# Patient Record
Sex: FEMALE | Race: WHITE | NOT HISPANIC OR LATINO | Employment: FULL TIME | URBAN - METROPOLITAN AREA
[De-identification: names, ages, dates, MRNs, and addresses within clinical notes are randomized per-mention and may not be internally consistent; named-entity substitution may affect disease eponyms.]

---

## 2019-06-26 ENCOUNTER — SUPERVISING PHYSICIAN REVIEW (OUTPATIENT)
Dept: URGENT CARE | Facility: CLINIC | Age: 24
End: 2019-06-26

## 2019-06-26 ENCOUNTER — OFFICE VISIT (OUTPATIENT)
Dept: URGENT CARE | Facility: CLINIC | Age: 24
End: 2019-06-26
Payer: COMMERCIAL

## 2019-06-26 VITALS
RESPIRATION RATE: 20 BRPM | HEIGHT: 64 IN | HEART RATE: 60 BPM | BODY MASS INDEX: 22.53 KG/M2 | OXYGEN SATURATION: 97 % | SYSTOLIC BLOOD PRESSURE: 110 MMHG | TEMPERATURE: 98.4 F | DIASTOLIC BLOOD PRESSURE: 72 MMHG | WEIGHT: 132 LBS

## 2019-06-26 DIAGNOSIS — A08.4 VIRAL GASTROENTERITIS: ICD-10-CM

## 2019-06-26 LAB
APPEARANCE UR: CLEAR
BILIRUB UR STRIP-MCNC: ABNORMAL MG/DL
COLOR UR AUTO: YELLOW
GLUCOSE UR STRIP.AUTO-MCNC: ABNORMAL MG/DL
INT CON NEG: NORMAL
INT CON POS: NORMAL
KETONES UR STRIP.AUTO-MCNC: ABNORMAL MG/DL
LEUKOCYTE ESTERASE UR QL STRIP.AUTO: ABNORMAL
NITRITE UR QL STRIP.AUTO: ABNORMAL
PH UR STRIP.AUTO: 8.5 [PH] (ref 5–8)
POC URINE PREGNANCY TEST: NEGATIVE
PROT UR QL STRIP: ABNORMAL MG/DL
RBC UR QL AUTO: ABNORMAL
SP GR UR STRIP.AUTO: 1.02
UROBILINOGEN UR STRIP-MCNC: 0.2 MG/DL

## 2019-06-26 PROCEDURE — 99203 OFFICE O/P NEW LOW 30 MIN: CPT | Performed by: PHYSICIAN ASSISTANT

## 2019-06-26 PROCEDURE — 81025 URINE PREGNANCY TEST: CPT | Performed by: PHYSICIAN ASSISTANT

## 2019-06-26 PROCEDURE — 81002 URINALYSIS NONAUTO W/O SCOPE: CPT | Performed by: PHYSICIAN ASSISTANT

## 2019-06-26 RX ORDER — ONDANSETRON 4 MG/1
4 TABLET, ORALLY DISINTEGRATING ORAL EVERY 6 HOURS PRN
Qty: 10 TAB | Refills: 0 | Status: SHIPPED | OUTPATIENT
Start: 2019-06-26 | End: 2019-06-29

## 2019-06-26 RX ORDER — ONDANSETRON 4 MG/1
4 TABLET, ORALLY DISINTEGRATING ORAL EVERY 6 HOURS PRN
Qty: 10 TAB | Refills: 0 | Status: SHIPPED | OUTPATIENT
Start: 2019-06-26 | End: 2019-06-26 | Stop reason: SDUPTHER

## 2019-06-26 ASSESSMENT — ENCOUNTER SYMPTOMS
FLANK PAIN: 0
VOMITING: 1
ABDOMINAL PAIN: 1
SORE THROAT: 0
CHILLS: 0
HEADACHES: 1
MYALGIAS: 1
FEVER: 0
CHILLS: 1
BLOOD IN STOOL: 0
NAUSEA: 1
ROS GI COMMENTS: 1
DIARRHEA: 1
COUGH: 0

## 2019-06-26 NOTE — PROGRESS NOTES
"Subjective:   Melissa Mckeon is a 24 y.o. female who presents for GI Problem (Vomiting and diarrhea)    This is a new problem.  Patient presents to urgent care with 4-day history of nausea, vomiting and diarrhea.  The patient reports that on the first day she had significant nausea and vomiting with intermittent diarrhea.  The vomiting has subsided but she continues to experience nausea.  She has been experiencing a proximately 4 loose watery stools on a daily basis.  Patient denies any fever or chills.  Denies hematochezia or melena.  Denies recent travel to endemic areas, drinking from Rivers or streams, recent antibiotic use, eating suspect foods.  She has had no known illness exposure. Patient does admit to dark stools however she has been taking Pepto-Bismol which has not been very helpful.      Upon direct questioning the patient does admit to urinary frequency however she denies any dysuria or urgency with urination.      .    GI Problem    Associated symptoms include abdominal pain, headaches, myalgias, nausea and vomiting. Pertinent negatives include no chest pain, chills, congestion, coughing, fever, rash or sore throat.     Review of Systems   Constitutional: Positive for malaise/fatigue. Negative for chills and fever.   HENT: Negative for congestion, ear pain and sore throat.    Respiratory: Negative for cough.    Cardiovascular: Negative for chest pain.   Gastrointestinal: Positive for abdominal pain, diarrhea, nausea and vomiting. Negative for blood in stool and melena.        1   Genitourinary: Positive for frequency. Negative for dysuria, flank pain, hematuria and urgency.   Musculoskeletal: Positive for myalgias.   Skin: Negative for rash.   Neurological: Positive for headaches.   All other systems reviewed and are negative.    No Known Allergies     Objective:   /72   Pulse 60   Temp 36.9 °C (98.4 °F) (Temporal)   Resp 20   Ht 1.626 m (5' 4\")   Wt 59.9 kg (132 lb)   LMP " 06/17/2019 (Exact Date)   SpO2 97%   Breastfeeding? No   BMI 22.66 kg/m²    Physical Exam   Constitutional: She is oriented to person, place, and time. She appears well-developed and well-nourished.   HENT:   Head: Normocephalic and atraumatic.   Right Ear: Tympanic membrane, external ear and ear canal normal.   Left Ear: Tympanic membrane, external ear and ear canal normal.   Nose: Nose normal.   Mouth/Throat: Uvula is midline, oropharynx is clear and moist and mucous membranes are normal. No oropharyngeal exudate.   Eyes: Pupils are equal, round, and reactive to light. Conjunctivae and EOM are normal.   Neck: Normal range of motion. Neck supple.   Cardiovascular: Normal rate, regular rhythm and normal heart sounds.  Exam reveals no gallop and no friction rub.    No murmur heard.  Pulmonary/Chest: Effort normal and breath sounds normal. No respiratory distress. She has no wheezes. She has no rales.   Abdominal: Soft. Bowel sounds are normal. She exhibits no distension and no mass. There is no hepatosplenomegaly. There is generalized tenderness. There is no rigidity, no rebound, no guarding, no CVA tenderness, no tenderness at McBurney's point and negative Alcocer's sign.   Musculoskeletal: Normal range of motion. She exhibits no edema, tenderness or deformity.   Lymphadenopathy:        Head (right side): No submental, no submandibular and no tonsillar adenopathy present.        Head (left side): No submental, no submandibular and no tonsillar adenopathy present.     She has no cervical adenopathy.        Right: No supraclavicular adenopathy present.        Left: No supraclavicular adenopathy present.   Neurological: She is alert and oriented to person, place, and time. She has normal strength. No cranial nerve deficit or sensory deficit. Coordination normal.   Skin: Skin is warm and dry. No rash noted.   Psychiatric: She has a normal mood and affect. Judgment normal.   Vitals reviewed.         Assessment/Plan:    Assessment    1. Viral gastroenteritis  - POCT Urinalysis  - POCT Pregnancy  - ondansetron (ZOFRAN ODT) 4 MG TABLET DISPERSIBLE; Take 1 Tab by mouth every 6 hours as needed for Nausea for up to 3 days.  Dispense: 10 Tab; Refill: 0    Results for orders placed or performed in visit on 06/26/19   POCT Urinalysis   Result Value Ref Range    POC Color YELLOW Negative    POC Appearance CLEAR Negative    POC Leukocyte Esterase neg Negative    POC Nitrites neg Negative    POC Urobiligen 0.2 Negative (0.2) mg/dL    POC Protein neg Negative mg/dL    POC Urine PH 8.5 (A) 5.0 - 8.0    POC Blood neg Negative    POC Specific Gravity 1.020 <1.005 - >1.030    POC Ketones neg Negative mg/dL    POC Bilirubin neg Negative mg/dL    POC Glucose neg Negative mg/dL   POCT Pregnancy   Result Value Ref Range    POC Urine Pregnancy Test NEGATIVE Negative    Internal Control Positive Valid     Internal Control Negative Valid      Patient will be treated with Zofran.  Recommend frequent small sips of clear fluids with advance as tolerated.  Also recommend BRAT diet with advance as tolerated.  Signs and symptoms of dehydration reviewed.  Red flag warning symptoms with strict ER/follow-up precautions given.    Differential diagnosis, natural history, supportive care, and indications for immediate follow-up discussed.      If not improving in 3-5 days, F/U with PCP or return to  or sooner if worsens    Please note that this note was created using voice recognition speech to text software. Every effort has been made to correct obvious errors.  However, I expect there are errors of grammar and possibly context that were not discovered prior to finalizing the note  NATALI Greenfield PA-C

## 2019-06-26 NOTE — PROGRESS NOTES
"Subjective:   Mckeon. Germaine Ledesma is a 13 y.o. female who presents for GI Problem (Vomiting and diarrhea)    This is a new problem.  Patient presents to urgent care with 4-day history of nausea, vomiting and diarrhea.  Initially, she developed generalized body aches, chills with nausea, vomiting and diarrhea.  The vomiting has subsided however she has persisted with 4 loose watery stools per day on average as well as continued nausea.  Denies hematochezia, melena or hematemesis.  She does report that her stools have appeared somewhat dark however she has been taking Pepto-Bismol.  No recent travel to endemic areas, no drinking from [place] or streams, no eating suspect foods, no recent antibiotic use.  No known exposure to illness.  Upon direct questioning, patient does report frequency with urination.  She does however deny dysuria or urinary urgency.        GI Problem    Associated symptoms include abdominal pain, chills, headaches, myalgias, nausea and vomiting. Pertinent negatives include no chest pain, congestion, coughing, fever, rash or sore throat.     Review of Systems   Constitutional: Positive for chills and malaise/fatigue. Negative for fever.   HENT: Negative for congestion, ear pain and sore throat.    Respiratory: Negative for cough.    Cardiovascular: Negative for chest pain.   Gastrointestinal: Positive for abdominal pain, diarrhea, nausea and vomiting.        1   Genitourinary: Positive for frequency. Negative for dysuria, flank pain, hematuria and urgency.   Musculoskeletal: Positive for myalgias.   Skin: Negative for rash.   Neurological: Positive for headaches.   All other systems reviewed and are negative.    No Known Allergies     Objective:   /72   Pulse 60   Temp 36.9 °C (98.4 °F) (Temporal)   Resp 20   Ht 1.626 m (5' 4\")   Wt 59.9 kg (132 lb)   LMP 06/17/2019 (Exact Date)   SpO2 97%   Breastfeeding? No   BMI 22.66 kg/m²    Physical Exam   Constitutional: She is oriented to " person, place, and time. She appears well-developed and well-nourished.   HENT:   Head: Normocephalic and atraumatic.   Right Ear: Tympanic membrane, external ear and ear canal normal.   Left Ear: Tympanic membrane, external ear and ear canal normal.   Nose: Nose normal.   Mouth/Throat: Uvula is midline, oropharynx is clear and moist and mucous membranes are normal. No oropharyngeal exudate.   Eyes: Pupils are equal, round, and reactive to light. Conjunctivae and EOM are normal.   Neck: Normal range of motion. Neck supple.   Cardiovascular: Normal rate, regular rhythm and normal heart sounds.  Exam reveals no gallop and no friction rub.    No murmur heard.  Pulmonary/Chest: Effort normal and breath sounds normal. No respiratory distress. She has no wheezes. She has no rales.   Abdominal: Soft. Bowel sounds are normal. She exhibits no distension and no mass. There is no hepatosplenomegaly. There is generalized tenderness. There is no rigidity, no rebound, no guarding, no CVA tenderness, no tenderness at McBurney's point and negative Alcocer's sign.   Musculoskeletal: Normal range of motion. She exhibits no edema, tenderness or deformity.   Lymphadenopathy:        Head (right side): No submental, no submandibular and no tonsillar adenopathy present.        Head (left side): No submental, no submandibular and no tonsillar adenopathy present.     She has no cervical adenopathy.        Right: No supraclavicular adenopathy present.        Left: No supraclavicular adenopathy present.   Neurological: She is alert and oriented to person, place, and time. She has normal strength. No cranial nerve deficit or sensory deficit. Coordination normal.   Skin: Skin is warm and dry. No rash noted.   Psychiatric: She has a normal mood and affect. Judgment normal.   Vitals reviewed.         Assessment/Plan:   Assessment    1. Gastroenteritis  - POCT Urinalysis  - POCT Pregnancy  - ondansetron (ZOFRAN ODT) 4 MG TABLET DISPERSIBLE; Take 1  Tab by mouth every 6 hours as needed for Nausea for up to 3 days.  Dispense: 10 Tab; Refill: 0    Results for orders placed or performed in visit on 06/26/19   POCT Urinalysis   Result Value Ref Range    POC Color YELLOW Negative    POC Appearance CLEAR Negative    POC Leukocyte Esterase neg Negative    POC Nitrites neg Negative    POC Urobiligen 0.2 Negative (0.2) mg/dL    POC Protein neg Negative mg/dL    POC Urine PH 8.5 (A) 5.0 - 8.0    POC Blood neg Negative    POC Specific Gravity 1.020 <1.005 - >1.030    POC Ketones neg Negative mg/dL    POC Bilirubin neg Negative mg/dL    POC Glucose neg Negative mg/dL   POCT Pregnancy   Result Value Ref Range    POC Urine Pregnancy Test NEGATIVE Negative    Internal Control Positive Valid     Internal Control Negative Valid      Patient will be treated with Zofran.  Recommend frequent small sips of clear fluids,BRAT diet with advance as tolerated.  Signs and symptoms of dehydration reviewed.    Differential diagnosis, natural history, supportive care, and indications for immediate follow-up discussed.      If not improving in 3-5 days, F/U with PCP or return to  or sooner if worsens  Red flag warning symptoms and strict ER/follow-up precautions given.    Please note that this note was created using voice recognition speech to text software. Every effort has been made to correct obvious errors.  However, I expect there are errors of grammar and possibly context that were not discovered prior to finalizing the note  NATALI Greenfield PA-C

## 2019-11-18 LAB
ALBUMIN SERPL BCP-MCNC: 4.6 G/DL (ref 3.2–4.9)
ALBUMIN/GLOB SERPL: 1.5 G/DL
ALP SERPL-CCNC: 52 U/L (ref 30–99)
ALT SERPL-CCNC: 14 U/L (ref 2–50)
ANION GAP SERPL CALC-SCNC: 14 MMOL/L (ref 0–11.9)
AST SERPL-CCNC: 21 U/L (ref 12–45)
BASOPHILS # BLD AUTO: 0.4 % (ref 0–1.8)
BASOPHILS # BLD: 0.04 K/UL (ref 0–0.12)
BILIRUB SERPL-MCNC: 0.5 MG/DL (ref 0.1–1.5)
BUN SERPL-MCNC: 5 MG/DL (ref 8–22)
CALCIUM SERPL-MCNC: 9.7 MG/DL (ref 8.4–10.2)
CHLORIDE SERPL-SCNC: 106 MMOL/L (ref 96–112)
CO2 SERPL-SCNC: 21 MMOL/L (ref 20–33)
CREAT SERPL-MCNC: 0.9 MG/DL (ref 0.5–1.4)
EOSINOPHIL # BLD AUTO: 0.27 K/UL (ref 0–0.51)
EOSINOPHIL NFR BLD: 3 % (ref 0–6.9)
ERYTHROCYTE [DISTWIDTH] IN BLOOD BY AUTOMATED COUNT: 38.6 FL (ref 35.9–50)
GLOBULIN SER CALC-MCNC: 3 G/DL (ref 1.9–3.5)
GLUCOSE SERPL-MCNC: 89 MG/DL (ref 65–99)
HCG SERPL QL: NEGATIVE
HCT VFR BLD AUTO: 44.5 % (ref 37–47)
HGB BLD-MCNC: 15.7 G/DL (ref 12–16)
IMM GRANULOCYTES # BLD AUTO: 0.02 K/UL (ref 0–0.11)
IMM GRANULOCYTES NFR BLD AUTO: 0.2 % (ref 0–0.9)
LIPASE SERPL-CCNC: 51 U/L (ref 7–58)
LYMPHOCYTES # BLD AUTO: 2.02 K/UL (ref 1–4.8)
LYMPHOCYTES NFR BLD: 22.5 % (ref 22–41)
MCH RBC QN AUTO: 31 PG (ref 27–33)
MCHC RBC AUTO-ENTMCNC: 35.3 G/DL (ref 33.6–35)
MCV RBC AUTO: 87.8 FL (ref 81.4–97.8)
MONOCYTES # BLD AUTO: 0.77 K/UL (ref 0–0.85)
MONOCYTES NFR BLD AUTO: 8.6 % (ref 0–13.4)
NEUTROPHILS # BLD AUTO: 5.87 K/UL (ref 2–7.15)
NEUTROPHILS NFR BLD: 65.3 % (ref 44–72)
NRBC # BLD AUTO: 0 K/UL
NRBC BLD-RTO: 0 /100 WBC
PLATELET # BLD AUTO: 241 K/UL (ref 164–446)
PMV BLD AUTO: 9.8 FL (ref 9–12.9)
POTASSIUM SERPL-SCNC: 3.8 MMOL/L (ref 3.6–5.5)
PROT SERPL-MCNC: 7.6 G/DL (ref 6–8.2)
RBC # BLD AUTO: 5.07 M/UL (ref 4.2–5.4)
SODIUM SERPL-SCNC: 141 MMOL/L (ref 135–145)
WBC # BLD AUTO: 9 K/UL (ref 4.8–10.8)

## 2019-11-18 PROCEDURE — 80053 COMPREHEN METABOLIC PANEL: CPT

## 2019-11-18 PROCEDURE — 83690 ASSAY OF LIPASE: CPT

## 2019-11-18 PROCEDURE — 85025 COMPLETE CBC W/AUTO DIFF WBC: CPT

## 2019-11-18 PROCEDURE — 36415 COLL VENOUS BLD VENIPUNCTURE: CPT

## 2019-11-18 PROCEDURE — 84703 CHORIONIC GONADOTROPIN ASSAY: CPT

## 2019-11-18 PROCEDURE — 99284 EMERGENCY DEPT VISIT MOD MDM: CPT

## 2019-11-18 SDOH — HEALTH STABILITY: MENTAL HEALTH: HOW OFTEN DO YOU HAVE A DRINK CONTAINING ALCOHOL?: NEVER

## 2019-11-19 ENCOUNTER — HOSPITAL ENCOUNTER (EMERGENCY)
Facility: MEDICAL CENTER | Age: 24
End: 2019-11-19
Attending: EMERGENCY MEDICINE
Payer: COMMERCIAL

## 2019-11-19 VITALS
WEIGHT: 123.46 LBS | HEIGHT: 64 IN | BODY MASS INDEX: 21.08 KG/M2 | HEART RATE: 99 BPM | SYSTOLIC BLOOD PRESSURE: 121 MMHG | RESPIRATION RATE: 16 BRPM | TEMPERATURE: 98.9 F | DIASTOLIC BLOOD PRESSURE: 73 MMHG | OXYGEN SATURATION: 96 %

## 2019-11-19 DIAGNOSIS — R19.7 DIARRHEA, UNSPECIFIED TYPE: ICD-10-CM

## 2019-11-19 DIAGNOSIS — R11.2 NON-INTRACTABLE VOMITING WITH NAUSEA, UNSPECIFIED VOMITING TYPE: ICD-10-CM

## 2019-11-19 LAB
APPEARANCE UR: CLEAR
BACTERIA #/AREA URNS HPF: ABNORMAL /HPF
BILIRUB UR QL STRIP.AUTO: NEGATIVE
COLOR UR: YELLOW
EPI CELLS #/AREA URNS HPF: ABNORMAL /HPF
GLUCOSE UR STRIP.AUTO-MCNC: NEGATIVE MG/DL
KETONES UR STRIP.AUTO-MCNC: NEGATIVE MG/DL
LEUKOCYTE ESTERASE UR QL STRIP.AUTO: NEGATIVE
MICRO URNS: ABNORMAL
MUCOUS THREADS #/AREA URNS HPF: ABNORMAL /HPF
NITRITE UR QL STRIP.AUTO: NEGATIVE
PH UR STRIP.AUTO: 7.5 [PH] (ref 5–8)
PROT UR QL STRIP: NEGATIVE MG/DL
RBC # URNS HPF: ABNORMAL /HPF
RBC UR QL AUTO: ABNORMAL
SP GR UR STRIP.AUTO: 1.01
WBC #/AREA URNS HPF: ABNORMAL /HPF

## 2019-11-19 PROCEDURE — 700111 HCHG RX REV CODE 636 W/ 250 OVERRIDE (IP): Performed by: EMERGENCY MEDICINE

## 2019-11-19 PROCEDURE — 700105 HCHG RX REV CODE 258: Performed by: EMERGENCY MEDICINE

## 2019-11-19 PROCEDURE — 96374 THER/PROPH/DIAG INJ IV PUSH: CPT

## 2019-11-19 PROCEDURE — 81001 URINALYSIS AUTO W/SCOPE: CPT

## 2019-11-19 PROCEDURE — 96375 TX/PRO/DX INJ NEW DRUG ADDON: CPT

## 2019-11-19 RX ORDER — ONDANSETRON 4 MG/1
4 TABLET, ORALLY DISINTEGRATING ORAL EVERY 8 HOURS PRN
Qty: 9 TAB | Refills: 0 | Status: SHIPPED | OUTPATIENT
Start: 2019-11-19 | End: 2019-11-25

## 2019-11-19 RX ORDER — HALOPERIDOL 5 MG/ML
2 INJECTION INTRAMUSCULAR ONCE
Status: COMPLETED | OUTPATIENT
Start: 2019-11-19 | End: 2019-11-19

## 2019-11-19 RX ORDER — SODIUM CHLORIDE 9 MG/ML
1000 INJECTION, SOLUTION INTRAVENOUS ONCE
Status: COMPLETED | OUTPATIENT
Start: 2019-11-19 | End: 2019-11-19

## 2019-11-19 RX ORDER — DIPHENHYDRAMINE HYDROCHLORIDE 50 MG/ML
25 INJECTION INTRAMUSCULAR; INTRAVENOUS ONCE
Status: COMPLETED | OUTPATIENT
Start: 2019-11-19 | End: 2019-11-19

## 2019-11-19 RX ADMIN — DIPHENHYDRAMINE HYDROCHLORIDE 25 MG: 50 INJECTION INTRAMUSCULAR; INTRAVENOUS at 02:19

## 2019-11-19 RX ADMIN — SODIUM CHLORIDE 1000 ML: 9 INJECTION, SOLUTION INTRAVENOUS at 02:20

## 2019-11-19 RX ADMIN — HALOPERIDOL LACTATE 2 MG: 5 INJECTION, SOLUTION INTRAMUSCULAR at 02:20

## 2019-11-19 NOTE — ED NOTES
Pt given d/c paperwork, pt verbalized understanding all information given. Pt ambulated out of the ER w/o difficulty w/ friend

## 2019-11-19 NOTE — ED TRIAGE NOTES
Pt ambulates to triage with male guest  Chief Complaint   Patient presents with   • N/V   • Diarrhea   x 1 wk  X 3 episodes of vomiting and x 3 episodes of diarrhea today  Hx of gastritis, and smokes marijuana   Pt A & 0 x 4, speech clear, ambulates well  Pt with tech to have blood work drawn, pt updated on triage process and pt asked to inform RN of any changes.

## 2019-11-19 NOTE — ED PROVIDER NOTES
"ED Provider Note    CHIEF COMPLAINT  Nausea, vomiting, and diarrhea    HPI  Melissa Mckeon is a 24 y.o. female who presents to the emergency department for evaluation of nausea, vomiting, and diarrhea. The patient states that she has had nausea, vomiting, and diarrhea over the last 2 weeks. She is vomiting about 2-3 times per day. The emesis is non-bloody and non-bilious. She states that she is having about 2-3 episodes of black appearing diarrhea per day. She states that taking a hot shower does also help. She denies fever. She has been able to drink fluids but has not been eating solids. She admits to some vague cramping abdominal pain. She is urinating normally. She recent foreign travel, suspicious food, or sick contacts. She does admit to using marijuana daily.    REVIEW OF SYSTEMS  See HPI for further details. All other systems are negative.     PAST MEDICAL HISTORY   has a past medical history of Gastritis.    SOCIAL HISTORY  Social History     Tobacco Use   • Smoking status: Never Smoker   • Smokeless tobacco: Never Used   Substance and Sexual Activity   • Alcohol use: Never     Frequency: Never   • Drug use: Yes     Types: Inhaled     Comment: thc   • Sexual activity: Not on file       SURGICAL HISTORY  patient denies any surgical history    CURRENT MEDICATIONS  Home Medications     Reviewed by Ludy Dye R.N. (Registered Nurse) on 11/18/19 at 2206  Med List Status: <None>   Medication Last Dose Status        Patient Srini Taking any Medications                       ALLERGIES  No Known Allergies    PHYSICAL EXAM  VITAL SIGNS: /92   Pulse 98   Temp 37.2 °C (98.9 °F) (Temporal)   Resp 16   Ht 1.626 m (5' 4\")   Wt 56 kg (123 lb 7.3 oz)   LMP 10/19/2019   BMI 21.19 kg/m²    Constitutional: Alert and in mild distress.  HENT: Normocephalic atraumatic. Bilateral external ears normal. Nose normal. Mucous membranes are dry.  Eyes: Pupils are equal and reactive. Conjunctiva normal. " Non-icteric sclera.   Neck: Normal range of motion without tenderness. Supple. No meningeal signs.  Cardiovascular: Regular rate and rhythm. No murmurs, gallops or rubs.  Thorax & Lungs: Breath sounds are clear to auscultation bilaterally. No wheezing, rhonchi or rales.  Abdomen: Soft, nontender and nondistended. No peritoneal signs noted.  Skin: Warm and dry. No rashes are noted.  Back: No bony tenderness, No CVA tenderness.   Extremities: 2+ peripheral pulses. Cap refill is less than 2 seconds. No edema, cyanosis, or clubbing.  Musculoskeletal: Good range of motion in all major joints. No tenderness to palpation or major deformities noted.   Neurologic: Alert and oriented ×3. The patient moves all 4 extremities and follows commands.  Psychiatric: Affect is normal. Judgment appears to be intact.    DIAGNOSTIC STUDIES / PROCEDURES    LABS  Results for orders placed or performed during the hospital encounter of 11/19/19   CBC WITH DIFFERENTIAL   Result Value Ref Range    WBC 9.0 4.8 - 10.8 K/uL    RBC 5.07 4.20 - 5.40 M/uL    Hemoglobin 15.7 12.0 - 16.0 g/dL    Hematocrit 44.5 37.0 - 47.0 %    MCV 87.8 81.4 - 97.8 fL    MCH 31.0 27.0 - 33.0 pg    MCHC 35.3 (H) 33.6 - 35.0 g/dL    RDW 38.6 35.9 - 50.0 fL    Platelet Count 241 164 - 446 K/uL    MPV 9.8 9.0 - 12.9 fL    Neutrophils-Polys 65.30 44.00 - 72.00 %    Lymphocytes 22.50 22.00 - 41.00 %    Monocytes 8.60 0.00 - 13.40 %    Eosinophils 3.00 0.00 - 6.90 %    Basophils 0.40 0.00 - 1.80 %    Immature Granulocytes 0.20 0.00 - 0.90 %    Nucleated RBC 0.00 /100 WBC    Neutrophils (Absolute) 5.87 2.00 - 7.15 K/uL    Lymphs (Absolute) 2.02 1.00 - 4.80 K/uL    Monos (Absolute) 0.77 0.00 - 0.85 K/uL    Eos (Absolute) 0.27 0.00 - 0.51 K/uL    Baso (Absolute) 0.04 0.00 - 0.12 K/uL    Immature Granulocytes (abs) 0.02 0.00 - 0.11 K/uL    NRBC (Absolute) 0.00 K/uL   COMP METABOLIC PANEL   Result Value Ref Range    Sodium 141 135 - 145 mmol/L    Potassium 3.8 3.6 - 5.5 mmol/L     Chloride 106 96 - 112 mmol/L    Co2 21 20 - 33 mmol/L    Anion Gap 14.0 (H) 0.0 - 11.9    Glucose 89 65 - 99 mg/dL    Bun 5 (L) 8 - 22 mg/dL    Creatinine 0.90 0.50 - 1.40 mg/dL    Calcium 9.7 8.4 - 10.2 mg/dL    AST(SGOT) 21 12 - 45 U/L    ALT(SGPT) 14 2 - 50 U/L    Alkaline Phosphatase 52 30 - 99 U/L    Total Bilirubin 0.5 0.1 - 1.5 mg/dL    Albumin 4.6 3.2 - 4.9 g/dL    Total Protein 7.6 6.0 - 8.2 g/dL    Globulin 3.0 1.9 - 3.5 g/dL    A-G Ratio 1.5 g/dL   LIPASE   Result Value Ref Range    Lipase 51 7 - 58 U/L   HCG QUAL SERUM   Result Value Ref Range    Beta-Hcg Qualitative Serum Negative Negative   URINALYSIS,CULTURE IF INDICATED   Result Value Ref Range    Color Yellow     Character Clear     Specific Gravity 1.010 <1.035    Ph 7.5 5.0 - 8.0    Glucose Negative Negative mg/dL    Ketones Negative Negative mg/dL    Protein Negative Negative mg/dL    Bilirubin Negative Negative    Nitrite Negative Negative    Leukocyte Esterase Negative Negative    Occult Blood Small (A) Negative    Micro Urine Req Microscopic    ESTIMATED GFR   Result Value Ref Range    GFR If African American >60 >60 mL/min/1.73 m 2    GFR If Non African American >60 >60 mL/min/1.73 m 2   URINE MICROSCOPIC (W/UA)   Result Value Ref Range    WBC 0-2 /hpf    RBC 0-2 /hpf    Bacteria Few (A) None /hpf    Epithelial Cells Few Few /hpf    Mucous Threads Few /hpf     COURSE & MEDICAL DECISION MAKING  Pertinent Labs & Imaging studies reviewed. (See chart for details)    This is a 24-year-old female presenting to the emergency department for the evaluation of vomiting and diarrhea.  On initial evaluation, the patient appeared uncomfortable but her abdominal exam was benign with no tenderness or distention.  Her vital signs were normal.  Her mucous membranes were dry and given her persistent vomiting, the plan was made to establish an IV and give her IV fluids.  She had admitted to using marijuana on a daily basis for an extended period of time and I  am concerned that she may be suffering from cannabinoid induced hyperemesis.  She was treated with Haldol and Benadryl as well as IV fluids.  Upon reassessment, she appeared markedly improved and tolerated an oral challenge.  Her blood work here in the emergency department was reassuring with no significant metabolic derangements.  Her white blood cell count was normal and I have low clinical suspicion for infectious etiology especially given the lack of fevers.  Pregnancy test was negative.  No infection was noted in the urine.  She had mentioned dark stools but declined a rectal exam.  She had admitted to taking Pepto-Bismol and I suspect this is the etiology of the dark stools especially given her normal H&H.  I do believe she stable for discharge at this time and encouraged her to follow-up with a primary care physician.  She was given a referral to the \[facility]\"" clinic and will make an appointment.  She understands return to the ED with any worsening signs or symptoms.    FINAL IMPRESSION  1. Non-intractable vomiting with nausea, unspecified vomiting type    2. Diarrhea, unspecified type      PRESCRIPTIONS  New Prescriptions    ONDANSETRON (ZOFRAN ODT) 4 MG TABLET DISPERSIBLE    Take 1 Tab by mouth every 8 hours as needed for Nausea.     FOLLOW UP  [address] [address]  [phone]  Call in 1 day  To schedule a follow-up with appointment    [facility], Emergency Dept  [id number]  [facility] [id number] [phone]  Go to   As needed    -DISCHARGE-    Electronically signed by: Reanna Soler, 11/19/2019 1:38 AM

## 2019-11-25 ENCOUNTER — OFFICE VISIT (OUTPATIENT)
Dept: URGENT CARE | Facility: CLINIC | Age: 24
End: 2019-11-25
Payer: COMMERCIAL

## 2019-11-25 VITALS
SYSTOLIC BLOOD PRESSURE: 104 MMHG | BODY MASS INDEX: 20.6 KG/M2 | RESPIRATION RATE: 16 BRPM | OXYGEN SATURATION: 98 % | WEIGHT: 120 LBS | HEART RATE: 72 BPM | TEMPERATURE: 98.2 F | DIASTOLIC BLOOD PRESSURE: 78 MMHG

## 2019-11-25 DIAGNOSIS — R11.0 NAUSEA: ICD-10-CM

## 2019-11-25 DIAGNOSIS — R19.7 DIARRHEA, UNSPECIFIED TYPE: Primary | ICD-10-CM

## 2019-11-25 PROCEDURE — 99214 OFFICE O/P EST MOD 30 MIN: CPT | Performed by: PHYSICIAN ASSISTANT

## 2019-11-25 RX ORDER — ONDANSETRON 8 MG/1
8 TABLET, ORALLY DISINTEGRATING ORAL EVERY 8 HOURS PRN
Qty: 15 TAB | Refills: 0 | Status: SHIPPED | OUTPATIENT
Start: 2019-11-25 | End: 2020-10-09

## 2019-11-25 ASSESSMENT — ENCOUNTER SYMPTOMS
FATIGUE: 1
WEIGHT LOSS: 1
DIARRHEA: 1

## 2019-11-25 NOTE — PROGRESS NOTES
Subjective:      Melissa Mckeon is a 24 y.o. female who presents with Diarrhea (x2.5 weeks todays became yellow, lower right abdominal pain ); Fatigue; and Weight Loss (10/15lbs over the last two weeks )    PMH:  has a past medical history of Gastritis.  MEDS:   Current Outpatient Medications:   •  ondansetron (ZOFRAN ODT) 8 MG TABLET DISPERSIBLE, Take 1 Tab by mouth every 8 hours as needed for Nausea., Disp: 15 Tab, Rfl: 0  ALLERGIES: No Known Allergies  SURGHX: History reviewed. No pertinent surgical history.  SOCHX:  reports that she has never smoked. She has never used smokeless tobacco. She reports current drug use. Drug: Inhaled. She reports that she does not drink alcohol.  FH: Reviewed with patient, not pertinent to this visit.           Patient presents with:  Diarrhea: x 4 weeks, waxing and waning in severity and number of stools daily.  Today her stool is yellow, with crampy lower right abdominal pain but denies bloody or black stool.  Pt has been eating a very bland diet, no caffeine, tried pepto but didn't really help and caused black stool, imodium completely stopped diarrhea but as soon as she would have a BM it was diarrhea.    Fatigue  Weight Loss: 10/15lbs over the last month due to diarrhea.    PT was seen in ER for same complaint 2 weeks ago, no real change in symptoms.      PT denies fever, chills, Urinary symptoms, history of UC, crohn's, IBS or IBD.          Diarrhea    This is a new problem. The current episode started 1 to 4 weeks ago. The problem occurs 2 to 4 times per day. The problem has been waxing and waning. The stool consistency is described as mucous and watery. The patient states that diarrhea does not awaken her from sleep. Associated symptoms include abdominal pain (crampy just prior to episode), headaches and weight loss. Pertinent negatives include no arthralgias, chills, fever, increased  flatus, myalgias, sweats or vomiting. Exacerbated by: unknown. There are no known  risk factors. She has tried anti-motility drug, bismuth subsalicylate, change of diet, electrolyte solution and increased fluids for the symptoms. The treatment provided no relief.   Fatigue   Associated symptoms include abdominal pain (crampy just prior to episode), fatigue, headaches and nausea. Pertinent negatives include no arthralgias, chest pain, chills, diaphoresis, fever, myalgias or vomiting.   Weight Loss   Associated symptoms include abdominal pain (crampy just prior to episode), fatigue, headaches and nausea. Pertinent negatives include no arthralgias, chest pain, chills, diaphoresis, fever, myalgias or vomiting.       Review of Systems   Constitutional: Positive for fatigue, malaise/fatigue and weight loss. Negative for chills, diaphoresis and fever.   Respiratory: Negative for sputum production and shortness of breath.    Cardiovascular: Negative for chest pain and palpitations.   Gastrointestinal: Positive for abdominal pain (crampy just prior to episode), diarrhea and nausea. Negative for blood in stool, constipation, flatus, heartburn, melena and vomiting.   Musculoskeletal: Negative for arthralgias and myalgias.   Neurological: Positive for headaches.   All other systems reviewed and are negative.         Objective:     /78   Pulse 72   Temp 36.8 °C (98.2 °F) (Temporal)   Resp 16   Wt 54.4 kg (120 lb)   SpO2 98%   BMI 20.60 kg/m²      Physical Exam  Vitals signs and nursing note reviewed.   Constitutional:       General: She is not in acute distress.     Appearance: Normal appearance. She is well-developed and normal weight. She is not ill-appearing, toxic-appearing or diaphoretic.   HENT:      Head: Normocephalic and atraumatic.      Nose: Nose normal.      Mouth/Throat:      Mouth: Mucous membranes are moist.      Pharynx: No posterior oropharyngeal erythema.   Eyes:      Extraocular Movements: Extraocular movements intact.      Conjunctiva/sclera: Conjunctivae normal.      Pupils:  Pupils are equal, round, and reactive to light.   Neck:      Musculoskeletal: Normal range of motion and neck supple.      Vascular: No JVD.   Cardiovascular:      Rate and Rhythm: Normal rate and regular rhythm.      Pulses: Normal pulses.      Heart sounds: Normal heart sounds.   Pulmonary:      Effort: Pulmonary effort is normal.      Breath sounds: Normal breath sounds.   Abdominal:      General: Abdomen is flat. Bowel sounds are increased.      Palpations: Abdomen is soft. There is no mass.      Tenderness: There is no tenderness. There is no guarding or rebound.      Hernia: No hernia is present.   Musculoskeletal: Normal range of motion.   Skin:     General: Skin is warm and dry.      Capillary Refill: Capillary refill takes less than 2 seconds.   Neurological:      General: No focal deficit present.      Mental Status: She is alert and oriented to person, place, and time.      Gait: Gait normal.   Psychiatric:         Mood and Affect: Mood normal.                 Assessment/Plan:     1. Diarrhea, unspecified type  CULTURE STOOL    C Diff by PCR rflx Toxin    ondansetron (ZOFRAN ODT) 8 MG TABLET DISPERSIBLE    CANCELED: Complete O&P   2. Nausea  ondansetron (ZOFRAN ODT) 8 MG TABLET DISPERSIBLE     Stool collection kit given to patient with instructions.    PT to follow clear diet for 8-12 hours, then progress to bland diet for 24 hours, then progress to regular diet as tolerated.     PT should follow up with PCP in 1-2 days for re-evaluation if symptoms have not improved.  Discussed red flags and reasons to return to UC or ED.  Pt and/or family verbalized understanding of diagnosis and follow up instructions and was offered informational handout on diagnosis.  PT discharged.

## 2019-11-29 ENCOUNTER — HOSPITAL ENCOUNTER (OUTPATIENT)
Facility: MEDICAL CENTER | Age: 24
End: 2019-11-29
Attending: PHYSICIAN ASSISTANT
Payer: COMMERCIAL

## 2019-11-29 DIAGNOSIS — R19.7 DIARRHEA, UNSPECIFIED TYPE: ICD-10-CM

## 2019-11-29 LAB
G LAMBLIA+C PARVUM AG STL QL RAPID: NORMAL
SIGNIFICANT IND 70042: NORMAL
SITE SITE: NORMAL
SOURCE SOURCE: NORMAL

## 2019-11-29 PROCEDURE — 87328 CRYPTOSPORIDIUM AG IA: CPT

## 2019-11-29 PROCEDURE — 87046 STOOL CULTR AEROBIC BACT EA: CPT

## 2019-11-29 PROCEDURE — 87045 FECES CULTURE AEROBIC BACT: CPT

## 2019-11-29 PROCEDURE — 87899 AGENT NOS ASSAY W/OPTIC: CPT

## 2019-11-29 ASSESSMENT — ENCOUNTER SYMPTOMS
HEARTBURN: 0
SHORTNESS OF BREATH: 0
CONSTIPATION: 0
DIAPHORESIS: 0
FLATUS: 0
NAUSEA: 1
BLOOD IN STOOL: 0
MYALGIAS: 0
FEVER: 0
ABDOMINAL PAIN: 1
SWEATS: 0
SPUTUM PRODUCTION: 0
VOMITING: 0
HEADACHES: 1
PALPITATIONS: 0
CHILLS: 0
ARTHRALGIAS: 0

## 2019-11-30 LAB
E COLI SXT1+2 STL IA: NORMAL
SIGNIFICANT IND 70042: NORMAL
SITE SITE: NORMAL
SOURCE SOURCE: NORMAL

## 2019-12-01 ENCOUNTER — HOSPITAL ENCOUNTER (OUTPATIENT)
Facility: MEDICAL CENTER | Age: 24
End: 2019-12-01
Attending: PHYSICIAN ASSISTANT
Payer: COMMERCIAL

## 2019-12-02 DIAGNOSIS — R19.7 DIARRHEA, UNSPECIFIED TYPE: ICD-10-CM

## 2019-12-02 LAB
BACTERIA STL CULT: NORMAL
E COLI SXT1+2 STL IA: NORMAL
SIGNIFICANT IND 70042: NORMAL
SITE SITE: NORMAL
SOURCE SOURCE: NORMAL

## 2020-10-09 ENCOUNTER — OFFICE VISIT (OUTPATIENT)
Dept: URGENT CARE | Facility: CLINIC | Age: 25
End: 2020-10-09
Payer: COMMERCIAL

## 2020-10-09 ENCOUNTER — TELEPHONE (OUTPATIENT)
Dept: SCHEDULING | Facility: IMAGING CENTER | Age: 25
End: 2020-10-09

## 2020-10-09 ENCOUNTER — HOSPITAL ENCOUNTER (OUTPATIENT)
Facility: MEDICAL CENTER | Age: 25
End: 2020-10-09
Attending: NURSE PRACTITIONER
Payer: COMMERCIAL

## 2020-10-09 VITALS
WEIGHT: 120 LBS | RESPIRATION RATE: 16 BRPM | SYSTOLIC BLOOD PRESSURE: 102 MMHG | TEMPERATURE: 97.5 F | HEIGHT: 64 IN | BODY MASS INDEX: 20.49 KG/M2 | OXYGEN SATURATION: 100 % | HEART RATE: 77 BPM | DIASTOLIC BLOOD PRESSURE: 70 MMHG

## 2020-10-09 DIAGNOSIS — R11.2 NAUSEA AND VOMITING, INTRACTABILITY OF VOMITING NOT SPECIFIED, UNSPECIFIED VOMITING TYPE: ICD-10-CM

## 2020-10-09 DIAGNOSIS — Z20.822 EXPOSURE TO COVID-19 VIRUS: ICD-10-CM

## 2020-10-09 LAB
APPEARANCE UR: NORMAL
BILIRUB UR STRIP-MCNC: NORMAL MG/DL
COLOR UR AUTO: NORMAL
COVID ORDER STATUS COVID19: NORMAL
GLUCOSE UR STRIP.AUTO-MCNC: NORMAL MG/DL
INT CON NEG: NORMAL
INT CON POS: NORMAL
KETONES UR STRIP.AUTO-MCNC: >160 MG/DL
LEUKOCYTE ESTERASE UR QL STRIP.AUTO: NORMAL
NITRITE UR QL STRIP.AUTO: NORMAL
PH UR STRIP.AUTO: 5.5 [PH] (ref 5–8)
POC URINE PREGNANCY TEST: NORMAL
PROT UR QL STRIP: NORMAL MG/DL
RBC UR QL AUTO: NORMAL
SARS-COV-2 RNA RESP QL NAA+PROBE: NOTDETECTED
SP GR UR STRIP.AUTO: >1.03
SPECIMEN SOURCE: NORMAL
UROBILINOGEN UR STRIP-MCNC: 1 MG/DL

## 2020-10-09 PROCEDURE — U0003 INFECTIOUS AGENT DETECTION BY NUCLEIC ACID (DNA OR RNA); SEVERE ACUTE RESPIRATORY SYNDROME CORONAVIRUS 2 (SARS-COV-2) (CORONAVIRUS DISEASE [COVID-19]), AMPLIFIED PROBE TECHNIQUE, MAKING USE OF HIGH THROUGHPUT TECHNOLOGIES AS DESCRIBED BY CMS-2020-01-R: HCPCS

## 2020-10-09 PROCEDURE — 81025 URINE PREGNANCY TEST: CPT | Performed by: NURSE PRACTITIONER

## 2020-10-09 PROCEDURE — 99214 OFFICE O/P EST MOD 30 MIN: CPT | Performed by: NURSE PRACTITIONER

## 2020-10-09 PROCEDURE — 81002 URINALYSIS NONAUTO W/O SCOPE: CPT | Performed by: NURSE PRACTITIONER

## 2020-10-09 RX ORDER — ONDANSETRON 4 MG/1
4 TABLET, ORALLY DISINTEGRATING ORAL ONCE
Status: COMPLETED | OUTPATIENT
Start: 2020-10-09 | End: 2020-10-09

## 2020-10-09 RX ORDER — ONDANSETRON 4 MG/1
4 TABLET, ORALLY DISINTEGRATING ORAL EVERY 6 HOURS PRN
Qty: 30 TAB | Refills: 0 | Status: SHIPPED | OUTPATIENT
Start: 2020-10-09 | End: 2022-01-31

## 2020-10-09 RX ORDER — NORETHINDRONE ACETATE AND ETHINYL ESTRADIOL 1MG-20(21)
KIT ORAL
COMMUNITY
Start: 2020-09-03 | End: 2021-09-29

## 2020-10-09 RX ADMIN — ONDANSETRON 4 MG: 4 TABLET, ORALLY DISINTEGRATING ORAL at 09:00

## 2020-10-09 ASSESSMENT — ENCOUNTER SYMPTOMS
NAUSEA: 1
FEVER: 0
CONSTIPATION: 0
HEADACHES: 0
SORE THROAT: 0
COUGH: 0
SHORTNESS OF BREATH: 0
JOINT SWELLING: 0
SWOLLEN GLANDS: 0
VOMITING: 1
NECK PAIN: 0
ANOREXIA: 0
BACK PAIN: 0
FATIGUE: 0
NUMBNESS: 0
WHEEZING: 0
CHILLS: 0
ARTHRALGIAS: 0
HEARTBURN: 0
DIZZINESS: 0
DIAPHORESIS: 0
ABDOMINAL PAIN: 1
DIARRHEA: 0
WEAKNESS: 0
MEMORY LOSS: 0
PALPITATIONS: 0
MYALGIAS: 1
ORTHOPNEA: 0
TINGLING: 0
CHANGE IN BOWEL HABIT: 0

## 2020-10-09 ASSESSMENT — FIBROSIS 4 INDEX: FIB4 SCORE: 0.63

## 2020-10-09 NOTE — PROGRESS NOTES
Subjective:      Melissa Mckeon is a 25 y.o. female who presents with Nausea (x5 days ) and Emesis (x1 day )            Patient presents to  with complaint of which has been ongoing for 5 days and vomiting which started yesterday.  Patient denies any sick contacts.  She denies any questionable food intake.  She has had similar episodes in the past requiring her to go to the emergency room for fluids.  She has been tolerating her diet until yesterday and now is only able to tolerate liquids.  Symptoms do seem to improve throughout the day.  Patient does smoke marijuana daily and we did discuss possibility of cyclic vomiting syndrome.  Patient states that in the past she has discontinued the marijuana during these episodes and does not seem to help.  She did have one episode of nausea and vomiting that lasted almost a month.  She has never been seen by a gastroenterologist.    Nausea  This is a new problem. Episode onset: Past 5 days. The problem occurs constantly. The problem has been gradually worsening. Associated symptoms include abdominal pain, myalgias, nausea and vomiting. Pertinent negatives include no anorexia, arthralgias, change in bowel habit, chest pain, chills, coughing, diaphoresis, fatigue, fever, headaches, joint swelling, neck pain, numbness, rash, sore throat, swollen glands, urinary symptoms or weakness. The symptoms are aggravated by eating and drinking. She has tried nothing for the symptoms. The treatment provided no relief.     She is on birth control and started new pill last month.  Periods irregular.   LMP: unsure but thinks 1.5 months ago.  Negative urine pregnancy in office today.      Review of Systems   Constitutional: Negative for chills, diaphoresis, fatigue and fever.   HENT: Negative for ear pain and sore throat.    Respiratory: Negative for cough, shortness of breath and wheezing.    Cardiovascular: Negative for chest pain, palpitations, orthopnea and leg swelling.  "  Gastrointestinal: Positive for abdominal pain, nausea and vomiting. Negative for anorexia, change in bowel habit, constipation, diarrhea and heartburn.   Musculoskeletal: Positive for myalgias. Negative for arthralgias, back pain, joint pain, joint swelling and neck pain.   Skin: Negative for rash.   Neurological: Negative for dizziness, tingling, weakness, numbness and headaches.   Psychiatric/Behavioral: Negative for memory loss and suicidal ideas.   All other systems reviewed and are negative.         Objective:     /70   Pulse 77   Temp 36.4 °C (97.5 °F) (Temporal)   Resp 16   Ht 1.626 m (5' 4\")   Wt 54.4 kg (120 lb)   SpO2 100%   Breastfeeding No   BMI 20.60 kg/m²      Physical Exam  Vitals signs reviewed.   Constitutional:       Appearance: Normal appearance.   HENT:      Head: Normocephalic.      Right Ear: External ear normal.      Left Ear: External ear normal.      Nose: Nose normal. No congestion.      Mouth/Throat:      Mouth: Mucous membranes are moist.   Eyes:      Extraocular Movements: Extraocular movements intact.      Conjunctiva/sclera: Conjunctivae normal.   Neck:      Musculoskeletal: Normal range of motion.   Cardiovascular:      Rate and Rhythm: Normal rate and regular rhythm.      Pulses: Normal pulses.      Heart sounds: Normal heart sounds. No murmur.   Pulmonary:      Effort: Pulmonary effort is normal.      Breath sounds: Normal breath sounds. No wheezing.   Abdominal:      General: Abdomen is flat. Bowel sounds are normal.      Palpations: Abdomen is soft.      Tenderness: There is generalized abdominal tenderness.   Musculoskeletal: Normal range of motion.   Lymphadenopathy:      Cervical: No cervical adenopathy.   Skin:     General: Skin is warm and dry.      Capillary Refill: Capillary refill takes less than 2 seconds.   Neurological:      Mental Status: She is alert and oriented to person, place, and time.   Psychiatric:         Mood and Affect: Mood normal.         " Behavior: Behavior normal.            Negative urine pregnancy       Assessment/Plan:         1. Nausea and vomiting, intractability of vomiting not specified, unspecified vomiting type  POCT Urinalysis    POCT PREGNANCY    ondansetron (ZOFRAN ODT) dispertab 4 mg    ondansetron (ZOFRAN ODT) 4 MG TABLET DISPERSIBLE    REFERRAL TO GASTROENTEROLOGY    COVID/SARS COV-2 PCR   2. Exposure to COVID-19 virus  COVID/SARS COV-2 PCR       Patient will be treated with prescription Zofran.  Advised frequent small sips of clear fluids and advance diet as tolerated.  Advised BRAT diet as tolerated.  Signs and symptoms of dehydration reviewed.  Red flag warning symptoms with strict ER/follow-up precautions given    Plan of care, medications and treatments reviewed with patient and or guardian.  Patient and or guardian voices understanding and agrees with the instructions provided. After visit summary reviewed with patient. Patient and or guardian understand the parameters for reevaluation and ER precautions discussed.     Discussed that urgent care setting has limited resources, therefore any worsening of symptoms should be evaluated in the ER. Patient and or guardian verbalized understanding.     Please note that this dictation was created using voice recognition software. I have made every reasonable attempt to correct obvious errors, but I expect that there are errors of grammar and possibly content that I did not discover before finalizing the note.

## 2020-10-26 NOTE — PROGRESS NOTES
Subjective:     CC:  The encounter diagnosis was Nausea and vomiting, intractability of vomiting not specified, unspecified vomiting type.    HISTORY OF THE PRESENT ILLNESS: Patient is a 25 y.o. female. This pleasant patient is here today to establish care and discuss nausea and vomiting. Her prior PCP was none.    Nausea & vomiting  This is a chronic condition.  Onset: about 2 years ago  Location: has occasional feeling of a pit in her stomach  Duration: intermittent.  Comes and goes for 1 day to a week at a time.  Quality: No blood in vomit  Modifying factors:  Worse when stressed.  Has some improvement in zofran  Associated symptoms:  Symptoms worsened in the past when she was under stress such as when he dog was sick.  Sometimes happens when she states she only has subconscious stress.  Sometimes feels heat sensation.  Has frequent burping.  No sour or metallic taste.  Has occasional diarrhea or constipation.  No food triggers.  No associated positions but sometimes worse when she lies on her side.  Home treatments: She previously went to [facility] [place] who submitted GI referral but she has not seen them yet.  No blood in poop.Denies any chance of pregnancy.        Allergies: Patient has no known allergies.    Current Outpatient Medications Ordered in Epic   Medication Sig Dispense Refill   • famotidine (PEPCID) 20 MG Tab Take 1 Tab by mouth 2 times a day. 60 Tab 1   • BLISOVI FE 1/20 1-20 MG-MCG per tablet      • ondansetron (ZOFRAN ODT) 4 MG TABLET DISPERSIBLE Take 1 Tab by mouth every 6 hours as needed for Nausea. 30 Tab 0     No current Epic-ordered facility-administered medications on file.        Past Medical History:   Diagnosis Date   • Gastritis        Past Surgical History:   Procedure Laterality Date   • DENTAL EXTRACTION(S)      widom teeth extraction       Social History     Tobacco Use   • Smoking status: Never Smoker   • Smokeless tobacco: Never Used   Substance Use Topics   • Alcohol use: Never      "Frequency: Never     Drinks per session: 1 or 2     Binge frequency: Never   • Drug use: Yes     Types: Inhaled, Marijuana     Comment: thc       Social History     Social History Narrative   • Not on file       Family History   Problem Relation Age of Onset   • Migraines Mother    • Thyroid Mother         hypothyroid   • Hypertension Father    • Diabetes Paternal Aunt    • Cancer Maternal Grandmother         breast cancer in her 60s       Health Maintenance: pt reports having pap smears by gynecologist with yearly gyn visits for routine care    ROS:   Gen: no fevers/chills, no changes in weight  Eyes: no changes in vision  ENT: no sore throat, no hearing loss, no bloody nose  Pulm: no sob, no cough  CV: no chest pain, no palpitations  GI: no nausea/vomiting, no diarrhea  : no dysuria  MSk: no myalgias  Skin: no rash  Neuro: no headaches, no numbness/tingling  Heme/Lymph: no easy bruising      Objective:     Exam: /68 (BP Location: Left arm, Patient Position: Sitting, BP Cuff Size: Adult)   Pulse 75   Temp 36.6 °C (97.9 °F) (Temporal)   Resp 14   Ht 1.626 m (5' 4\")   Wt 64.4 kg (142 lb)   SpO2 95%  Body mass index is 24.37 kg/m².    General: Normal appearing. No distress.  HEENT: Normocephalic.  Canals are clear bilaterally, tympanic membranes are benign, nasal mucosa benign, oropharynx is without erythema, edema or exudates.   Eyes: Eyes conjunctiva clear lids without ptosis, pupils equal and reactive to light accommodation, ears normal shape and contour  Neck: Supple. Thyroid is not enlarged.  Pulmonary: Clear to ausculation.  Normal effort. No rales, ronchi, or wheezing.  Cardiovascular: Regular rate and rhythm without murmur.   Abdomen: Soft, nontender, nondistended. Normal bowel sounds. Liver and spleen are not palpable  Neurologic: No gross motor deficits  Lymph: No cervical or supraclavicular lymph nodes are palpable  Skin: Warm and dry.  No obvious lesions.  Musculoskeletal: Normal gait. No " extremity cyanosis, clubbing, or edema.  Psych: Normal mood and affect. Alert and oriented x3. Judgment and insight is normal. PHQ9= 2    Assessment & Plan:   25 y.o. female with the following -    1. Nausea and vomiting, intractability of vomiting not specified, unspecified vomiting type  Chronic, intermittent issue, last episode about 1 month ago at which time when [place] [facility] [place] and UA were normal.  She denies any chance of pregnancy and denies pregnancy test.  Her symptoms occur about 4 times per year and last days to a week at a time.  She states symptoms only occur when stressed, so likely a reaction to stress vs GERD.  Will check labs and do trial of pepcid.  Stress reduction methods discussed.  She declines anxiety and depression.  Will monitor and follow-up precautions given. Patient expressed understanding and agreement with plan.  - CBC WITH DIFFERENTIAL; Future  - Comp Metabolic Panel; Future  - LIPASE; Future  - famotidine (PEPCID) 20 MG Tab; Take 1 Tab by mouth 2 times a day.  Dispense: 60 Tab; Refill: 1  - H.PYLORI AB SCREEN; Future    Return in about 4 weeks (around 11/30/2020) for lab review.    Please note that this dictation was created using voice recognition software. I have made every reasonable attempt to correct obvious errors, but I expect that there are errors of grammar and possibly content that I did not discover before finalizing the note.

## 2020-11-01 SDOH — HEALTH STABILITY: PHYSICAL HEALTH: ON AVERAGE, HOW MANY MINUTES DO YOU ENGAGE IN EXERCISE AT THIS LEVEL?: 0 MINUTES

## 2020-11-01 SDOH — ECONOMIC STABILITY: HOUSING INSECURITY
IN THE LAST 12 MONTHS, WAS THERE A TIME WHEN YOU DID NOT HAVE A STEADY PLACE TO SLEEP OR SLEPT IN A SHELTER (INCLUDING NOW)?: NO

## 2020-11-01 SDOH — ECONOMIC STABILITY: INCOME INSECURITY: IN THE LAST 12 MONTHS, WAS THERE A TIME WHEN YOU WERE NOT ABLE TO PAY THE MORTGAGE OR RENT ON TIME?: NO

## 2020-11-01 SDOH — ECONOMIC STABILITY: TRANSPORTATION INSECURITY
IN THE PAST 12 MONTHS, HAS THE LACK OF TRANSPORTATION KEPT YOU FROM MEDICAL APPOINTMENTS OR FROM GETTING MEDICATIONS?: NO

## 2020-11-01 SDOH — HEALTH STABILITY: PHYSICAL HEALTH: ON AVERAGE, HOW MANY DAYS PER WEEK DO YOU ENGAGE IN MODERATE TO STRENUOUS EXERCISE (LIKE A BRISK WALK)?: 0 DAYS

## 2020-11-01 SDOH — ECONOMIC STABILITY: HOUSING INSECURITY: IN THE LAST 12 MONTHS, HOW MANY PLACES HAVE YOU LIVED?: 2

## 2020-11-01 SDOH — HEALTH STABILITY: MENTAL HEALTH
STRESS IS WHEN SOMEONE FEELS TENSE, NERVOUS, ANXIOUS, OR CAN'T SLEEP AT NIGHT BECAUSE THEIR MIND IS TROUBLED. HOW STRESSED ARE YOU?: TO SOME EXTENT

## 2020-11-01 SDOH — ECONOMIC STABILITY: TRANSPORTATION INSECURITY
IN THE PAST 12 MONTHS, HAS LACK OF RELIABLE TRANSPORTATION KEPT YOU FROM MEDICAL APPOINTMENTS, MEETINGS, WORK OR FROM GETTING THINGS NEEDED FOR DAILY LIVING?: NO

## 2020-11-01 ASSESSMENT — SOCIAL DETERMINANTS OF HEALTH (SDOH)
HOW HARD IS IT FOR YOU TO PAY FOR THE VERY BASICS LIKE FOOD, HOUSING, MEDICAL CARE, AND HEATING?: NOT HARD AT ALL
ARE YOU MARRIED, WIDOWED, DIVORCED, SEPARATED, NEVER MARRIED, OR LIVING WITH A PARTNER?: LIVING WITH PARTNER
DO YOU BELONG TO ANY CLUBS OR ORGANIZATIONS SUCH AS CHURCH GROUPS UNIONS, FRATERNAL OR ATHLETIC GROUPS, OR SCHOOL GROUPS?: NO
HOW OFTEN DO YOU ATTEND CHURCH OR RELIGIOUS SERVICES?: NEVER
HOW OFTEN DO YOU ATTENT MEETINGS OF THE CLUB OR ORGANIZATION YOU BELONG TO?: NEVER
HOW OFTEN DO YOU HAVE A DRINK CONTAINING ALCOHOL: NEVER
IN A TYPICAL WEEK, HOW MANY TIMES DO YOU TALK ON THE PHONE WITH FAMILY, FRIENDS, OR NEIGHBORS?: ONCE A WEEK
HOW OFTEN DO YOU HAVE SIX OR MORE DRINKS ON ONE OCCASION: NEVER
WITHIN THE PAST 12 MONTHS, YOU WORRIED THAT YOUR FOOD WOULD RUN OUT BEFORE YOU GOT THE MONEY TO BUY MORE: NEVER TRUE
WITHIN THE PAST 12 MONTHS, THE FOOD YOU BOUGHT JUST DIDN'T LAST AND YOU DIDN'T HAVE MONEY TO GET MORE: NEVER TRUE
HOW MANY DRINKS CONTAINING ALCOHOL DO YOU HAVE ON A TYPICAL DAY WHEN YOU ARE DRINKING: 1 OR 2
HOW OFTEN DO YOU GET TOGETHER WITH FRIENDS OR RELATIVES?: ONCE A WEEK

## 2020-11-02 ENCOUNTER — OFFICE VISIT (OUTPATIENT)
Dept: MEDICAL GROUP | Facility: MEDICAL CENTER | Age: 25
End: 2020-11-02
Payer: COMMERCIAL

## 2020-11-02 VITALS
BODY MASS INDEX: 24.24 KG/M2 | HEIGHT: 64 IN | WEIGHT: 142 LBS | HEART RATE: 75 BPM | RESPIRATION RATE: 14 BRPM | OXYGEN SATURATION: 95 % | SYSTOLIC BLOOD PRESSURE: 108 MMHG | TEMPERATURE: 97.9 F | DIASTOLIC BLOOD PRESSURE: 68 MMHG

## 2020-11-02 DIAGNOSIS — R11.2 NAUSEA AND VOMITING, INTRACTABILITY OF VOMITING NOT SPECIFIED, UNSPECIFIED VOMITING TYPE: ICD-10-CM

## 2020-11-02 PROCEDURE — 99203 OFFICE O/P NEW LOW 30 MIN: CPT | Performed by: FAMILY MEDICINE

## 2020-11-02 RX ORDER — FAMOTIDINE 20 MG/1
20 TABLET, FILM COATED ORAL 2 TIMES DAILY
Qty: 60 TAB | Refills: 1 | Status: SHIPPED | OUTPATIENT
Start: 2020-11-02 | End: 2020-12-02 | Stop reason: SDUPTHER

## 2020-11-02 ASSESSMENT — PATIENT HEALTH QUESTIONNAIRE - PHQ9
5. POOR APPETITE OR OVEREATING: 0 - NOT AT ALL
CLINICAL INTERPRETATION OF PHQ2 SCORE: 1
SUM OF ALL RESPONSES TO PHQ QUESTIONS 1-9: 2

## 2020-11-02 ASSESSMENT — FIBROSIS 4 INDEX: FIB4 SCORE: 0.63

## 2020-11-02 NOTE — ASSESSMENT & PLAN NOTE
This is a chronic condition.  Onset: about 2 years ago  Location: has occasional feeling of a pit in her stomach  Duration: intermittent.  Comes and goes for 1 day to a week at a time.  Quality: No blood in vomit  Modifying factors:  Worse when stressed.  Has some improvement in zofran  Associated symptoms:  Symptoms worsened in the past when she was under stress such as when he dog was sick.  Sometimes happens when she states she only has subconscious stress.  Sometimes feels heat sensation.  Has frequent burping.  No sour or metallic taste.  Has occasional diarrhea or constipation.  No food triggers.  No associated positions but sometimes worse when she lies on her side.  Home treatments: She previously went to [facility] [place] who [place] GI referral but she has not seen them yet.  No blood in poop.Denies any chance of pregnancy.

## 2020-11-18 ENCOUNTER — HOSPITAL ENCOUNTER (OUTPATIENT)
Dept: LAB | Facility: MEDICAL CENTER | Age: 25
End: 2020-11-18
Attending: FAMILY MEDICINE
Payer: COMMERCIAL

## 2020-11-18 DIAGNOSIS — R11.2 NAUSEA AND VOMITING, INTRACTABILITY OF VOMITING NOT SPECIFIED, UNSPECIFIED VOMITING TYPE: ICD-10-CM

## 2020-11-18 LAB
ALBUMIN SERPL BCP-MCNC: 4 G/DL (ref 3.2–4.9)
ALBUMIN/GLOB SERPL: 1.3 G/DL
ALP SERPL-CCNC: 37 U/L (ref 30–99)
ALT SERPL-CCNC: 14 U/L (ref 2–50)
ANION GAP SERPL CALC-SCNC: 11 MMOL/L (ref 7–16)
AST SERPL-CCNC: 21 U/L (ref 12–45)
BASOPHILS # BLD AUTO: 0.3 % (ref 0–1.8)
BASOPHILS # BLD: 0.03 K/UL (ref 0–0.12)
BILIRUB SERPL-MCNC: 0.7 MG/DL (ref 0.1–1.5)
BUN SERPL-MCNC: 8 MG/DL (ref 8–22)
CALCIUM SERPL-MCNC: 9.5 MG/DL (ref 8.4–10.2)
CHLORIDE SERPL-SCNC: 107 MMOL/L (ref 96–112)
CO2 SERPL-SCNC: 22 MMOL/L (ref 20–33)
CREAT SERPL-MCNC: 1.02 MG/DL (ref 0.5–1.4)
EOSINOPHIL # BLD AUTO: 0.15 K/UL (ref 0–0.51)
EOSINOPHIL NFR BLD: 1.7 % (ref 0–6.9)
ERYTHROCYTE [DISTWIDTH] IN BLOOD BY AUTOMATED COUNT: 40.1 FL (ref 35.9–50)
FASTING STATUS PATIENT QL REPORTED: NORMAL
GLOBULIN SER CALC-MCNC: 3.2 G/DL (ref 1.9–3.5)
GLUCOSE SERPL-MCNC: 84 MG/DL (ref 65–99)
HCT VFR BLD AUTO: 46.8 % (ref 37–47)
HGB BLD-MCNC: 16.2 G/DL (ref 12–16)
IMM GRANULOCYTES # BLD AUTO: 0.03 K/UL (ref 0–0.11)
IMM GRANULOCYTES NFR BLD AUTO: 0.3 % (ref 0–0.9)
LIPASE SERPL-CCNC: 52 U/L (ref 7–58)
LYMPHOCYTES # BLD AUTO: 2.79 K/UL (ref 1–4.8)
LYMPHOCYTES NFR BLD: 31.9 % (ref 22–41)
MCH RBC QN AUTO: 31.2 PG (ref 27–33)
MCHC RBC AUTO-ENTMCNC: 34.6 G/DL (ref 33.6–35)
MCV RBC AUTO: 90 FL (ref 81.4–97.8)
MONOCYTES # BLD AUTO: 0.41 K/UL (ref 0–0.85)
MONOCYTES NFR BLD AUTO: 4.7 % (ref 0–13.4)
NEUTROPHILS # BLD AUTO: 5.33 K/UL (ref 2–7.15)
NEUTROPHILS NFR BLD: 61.1 % (ref 44–72)
NRBC # BLD AUTO: 0 K/UL
NRBC BLD-RTO: 0 /100 WBC
PLATELET # BLD AUTO: 258 K/UL (ref 164–446)
PMV BLD AUTO: 9.2 FL (ref 9–12.9)
POTASSIUM SERPL-SCNC: 4 MMOL/L (ref 3.6–5.5)
PROT SERPL-MCNC: 7.2 G/DL (ref 6–8.2)
RBC # BLD AUTO: 5.2 M/UL (ref 4.2–5.4)
SODIUM SERPL-SCNC: 140 MMOL/L (ref 135–145)
WBC # BLD AUTO: 8.7 K/UL (ref 4.8–10.8)

## 2020-11-18 PROCEDURE — 85025 COMPLETE CBC W/AUTO DIFF WBC: CPT

## 2020-11-18 PROCEDURE — 36415 COLL VENOUS BLD VENIPUNCTURE: CPT

## 2020-11-18 PROCEDURE — 83690 ASSAY OF LIPASE: CPT

## 2020-11-18 PROCEDURE — 80053 COMPREHEN METABOLIC PANEL: CPT

## 2020-12-01 ENCOUNTER — HOSPITAL ENCOUNTER (OUTPATIENT)
Facility: MEDICAL CENTER | Age: 25
End: 2020-12-01
Attending: FAMILY MEDICINE
Payer: COMMERCIAL

## 2020-12-01 DIAGNOSIS — R11.2 NAUSEA AND VOMITING, INTRACTABILITY OF VOMITING NOT SPECIFIED, UNSPECIFIED VOMITING TYPE: ICD-10-CM

## 2020-12-01 PROCEDURE — 87338 HPYLORI STOOL AG IA: CPT

## 2020-12-02 ENCOUNTER — OFFICE VISIT (OUTPATIENT)
Dept: MEDICAL GROUP | Facility: MEDICAL CENTER | Age: 25
End: 2020-12-02
Payer: COMMERCIAL

## 2020-12-02 VITALS
RESPIRATION RATE: 12 BRPM | OXYGEN SATURATION: 98 % | DIASTOLIC BLOOD PRESSURE: 68 MMHG | TEMPERATURE: 98.3 F | WEIGHT: 140 LBS | SYSTOLIC BLOOD PRESSURE: 100 MMHG | HEART RATE: 70 BPM | HEIGHT: 64 IN | BODY MASS INDEX: 23.9 KG/M2

## 2020-12-02 DIAGNOSIS — R11.2 NAUSEA AND VOMITING, INTRACTABILITY OF VOMITING NOT SPECIFIED, UNSPECIFIED VOMITING TYPE: ICD-10-CM

## 2020-12-02 LAB — H PYLORI AG STL QL IA: NOT DETECTED

## 2020-12-02 PROCEDURE — 99213 OFFICE O/P EST LOW 20 MIN: CPT | Performed by: FAMILY MEDICINE

## 2020-12-02 RX ORDER — FAMOTIDINE 20 MG/1
20 TABLET, FILM COATED ORAL 2 TIMES DAILY
Qty: 60 TAB | Refills: 1 | Status: SHIPPED | OUTPATIENT
Start: 2020-12-02 | End: 2020-12-28

## 2020-12-02 ASSESSMENT — FIBROSIS 4 INDEX: FIB4 SCORE: 0.59

## 2020-12-02 NOTE — PROGRESS NOTES
Subjective:     CC: lab review, chronic nausea/vomiting    HPI:   Melissa presents today with     Nausea & vomiting  Chronic issue for last 2 years.  Rare vomiting since 10/2020 but still has nausea almost every morning and worse on work days, still.  She did her stool test yesterday but results are not back yet.  She is taking zofran about once every about 3 weeks for severe nausea and that reduces but does not resolve nausea.  No blood in vomit when she does vomit.  Triggers- high stress environments.  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  Has frequent burping but no sour or metallic taste.  Sometimes feels nausea when eating but no difficult swallowing.  She has not scheduled her GI appointment, yet. No fevers, chills, blood in poop.  Denies any change of pregnancy.  Sometimes has constipation and then has intermittent diarrhea.  States the diarrhea and constipation are sometimes related to mood but not always. Eats in the evening around 6-7pm.  Goes to bed at 10pm. Does not eat spicy foods, chocolate, rare tomatoes, no citrus.  Denies anxiety, depression, SI/HI.  States she has baseline high levels of functional anxiety but it does not interfere with her life.  She never started famotidine since last visit.      Past Medical History:   Diagnosis Date   • Gastritis        Social History     Tobacco Use   • Smoking status: Never Smoker   • Smokeless tobacco: Never Used   Substance Use Topics   • Alcohol use: Never     Frequency: Never     Drinks per session: 1 or 2     Binge frequency: Never   • Drug use: Yes     Types: Inhaled, Marijuana     Comment: thc       Current Outpatient Medications Ordered in Epic   Medication Sig Dispense Refill   • famotidine (PEPCID) 20 MG Tab Take 1 Tab by mouth 2 times a day. 60 Tab 1   • BLISOVI FE 1/20 1-20 MG-MCG per tablet      • ondansetron (ZOFRAN ODT) 4 MG TABLET DISPERSIBLE Take 1 Tab by mouth every 6 hours as needed for Nausea. 30 Tab 0  "    No current [facility]-ordered facility-administered medications on file.        Allergies:  Patient has no known allergies.    Health Maintenance: pt has pap smears through gynecologist and will bring her information re: last pap smear next visit.    ROS:  Gen: no fevers/chills, no changes in weight  Eyes: no changes in vision  ENT: no sore throat, no hearing loss, no bloody nose  Pulm: no sob, no cough  CV: no chest pain, no palpitations  GI: per hpi  : no dysuria  MSk: no myalgias  Skin: no rash  Neuro: no headaches, no numbness/tingling  Heme/Lymph: no easy bruising      Objective:       Exam:  /68 (BP Location: Right arm, Patient Position: Sitting, BP Cuff Size: Adult)   Pulse 70   Temp 36.8 °C (98.3 °F) (Temporal)   Resp 12   Ht 1.626 m (5' 4\")   Wt 63.5 kg (140 lb)   LMP 11/16/2020   SpO2 98%   BMI 24.03 kg/m²  Body mass index is 24.03 kg/m².    Gen: Alert and oriented, No apparent distress.  Neck: Neck is supple without lymphadenopathy.  Lungs: Normal effort, CTA bilaterally, no wheezes, rhonchi, or rales  CV: Regular rate and rhythm. No murmurs, rubs, or gallops.  Ext: No clubbing, cyanosis, edema.  Abd Soft, nontender, nondistended    Assessment & Plan:     25 y.o. female with the following -     1. Nausea and vomiting, intractability of vomiting not specified, unspecified vomiting type  Chronic, stable.  Daily nausea with intermittent vomiting.  Given worse in AM, concern for possible GERD vs hiatal hernia.  Recent labs were normal and we are awaiting her h.pylori results.  Will start pepcid.  Encouraged GERD prevention diet and lifestyle modifications.  Encouraged GI evaluation as previously referred given duration of symptoms she may benefit from EGD and possible colonoscopy given intermittent diarrhea/constipation.  Will consider mood medications for anxiety if symptoms persist vs PPI. Follow-up precautions given. Patient expressed understanding and agreement with plan.  - famotidine " (PEPCID) 20 MG Tab; Take 1 Tab by mouth 2 times a day.  Dispense: 60 Tab; Refill: 1      Return in about 4 weeks (around 12/30/2020) for follow-up nausea/vomiting/GERD.    Please note that this dictation was created using voice recognition software. I have made every reasonable attempt to correct obvious errors, but I expect that there are errors of grammar and possibly content that I did not discover before finalizing the note.

## 2020-12-03 NOTE — ASSESSMENT & PLAN NOTE
Chronic issue for last 2 years.  Rare vomiting since 10/2020 but still has nausea almost every morning and worse on work days, still.  She did her stool test yesterday but results are not back yet.  She is taking zofran about once every about 3 weeks for severe nausea and that reduces but does not resolve nausea.  No blood in vomit when she does vomit.  Triggers- high stress environments.  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  Has frequent burping but no sour or metallic taste.  Sometimes feels nausea when eating but no difficult swallowing.  She has not scheduled her GI appointment, yet. No fevers, chills, blood in poop.  Denies any change of pregnancy.  Sometimes has constipation and then has intermittent diarrhea.  States the diarrhea and constipation are sometimes related to mood but not always. Eats in the evening around 6-7pm.  Goes to bed at 10pm. Does not eat spicy foods, chocolate, rare tomatoes, no citrus.  Denies anxiety, depression, SI/HI.  States she has baseline high levels of functional anxiety but it does not interfere with her life.  She never started famotidine since last visit.

## 2020-12-25 DIAGNOSIS — R11.2 NAUSEA AND VOMITING, INTRACTABILITY OF VOMITING NOT SPECIFIED, UNSPECIFIED VOMITING TYPE: ICD-10-CM

## 2020-12-28 RX ORDER — FAMOTIDINE 20 MG/1
TABLET, FILM COATED ORAL
Qty: 60 TAB | Refills: 1 | Status: SHIPPED | OUTPATIENT
Start: 2020-12-28 | End: 2021-01-11

## 2021-01-09 DIAGNOSIS — R11.2 NAUSEA AND VOMITING, INTRACTABILITY OF VOMITING NOT SPECIFIED, UNSPECIFIED VOMITING TYPE: ICD-10-CM

## 2021-01-11 RX ORDER — FAMOTIDINE 20 MG/1
TABLET, FILM COATED ORAL
Qty: 180 TAB | Refills: 1 | Status: SHIPPED | OUTPATIENT
Start: 2021-01-11 | End: 2021-01-13

## 2021-01-13 ENCOUNTER — OFFICE VISIT (OUTPATIENT)
Dept: MEDICAL GROUP | Facility: MEDICAL CENTER | Age: 26
End: 2021-01-13
Payer: COMMERCIAL

## 2021-01-13 VITALS
WEIGHT: 142 LBS | OXYGEN SATURATION: 98 % | DIASTOLIC BLOOD PRESSURE: 68 MMHG | TEMPERATURE: 98.6 F | SYSTOLIC BLOOD PRESSURE: 114 MMHG | HEIGHT: 64 IN | BODY MASS INDEX: 24.24 KG/M2 | RESPIRATION RATE: 12 BRPM | HEART RATE: 87 BPM

## 2021-01-13 DIAGNOSIS — F32.A ANXIETY AND DEPRESSION: ICD-10-CM

## 2021-01-13 DIAGNOSIS — F41.9 ANXIETY AND DEPRESSION: ICD-10-CM

## 2021-01-13 DIAGNOSIS — K21.9 GASTROESOPHAGEAL REFLUX DISEASE, UNSPECIFIED WHETHER ESOPHAGITIS PRESENT: ICD-10-CM

## 2021-01-13 PROCEDURE — 99213 OFFICE O/P EST LOW 20 MIN: CPT | Performed by: FAMILY MEDICINE

## 2021-01-13 RX ORDER — OMEPRAZOLE 20 MG/1
20 CAPSULE, DELAYED RELEASE ORAL
COMMUNITY
Start: 2021-01-03 | End: 2021-07-28 | Stop reason: SDUPTHER

## 2021-01-13 ASSESSMENT — PATIENT HEALTH QUESTIONNAIRE - PHQ9
SUM OF ALL RESPONSES TO PHQ QUESTIONS 1-9: 4
5. POOR APPETITE OR OVEREATING: 0 - NOT AT ALL
CLINICAL INTERPRETATION OF PHQ2 SCORE: 2

## 2021-01-13 ASSESSMENT — ANXIETY QUESTIONNAIRES
4. TROUBLE RELAXING: SEVERAL DAYS
6. BECOMING EASILY ANNOYED OR IRRITABLE: NOT AT ALL
5. BEING SO RESTLESS THAT IT IS HARD TO SIT STILL: NOT AT ALL
GAD7 TOTAL SCORE: 4
1. FEELING NERVOUS, ANXIOUS, OR ON EDGE: SEVERAL DAYS
2. NOT BEING ABLE TO STOP OR CONTROL WORRYING: SEVERAL DAYS
3. WORRYING TOO MUCH ABOUT DIFFERENT THINGS: SEVERAL DAYS
7. FEELING AFRAID AS IF SOMETHING AWFUL MIGHT HAPPEN: NOT AT ALL

## 2021-01-13 ASSESSMENT — FIBROSIS 4 INDEX: FIB4 SCORE: 0.59

## 2021-01-13 NOTE — ASSESSMENT & PLAN NOTE
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  States she feels more nausea when at work when stress is severe.  She is working with her boss to help adjust her work position, to help reduce the stress burden.  No SI/HI.  She is not exercise and not avoiding food triggers that worsen her GERD.  She would like to see a therapist to discuss methods to help reduce her anxiety and mild depression symptoms.

## 2021-01-13 NOTE — ASSESSMENT & PLAN NOTE
Chronic issue for last 2 years. Since last visit, she took famotidine for 1 week with some relief.  Then saw GI and they switched her to omeprazole with improvement in symptoms.  States her last episode of nausea and vomiting was one week ago and was only nausea.  She is stooling normally.  No blood stools.  She has and EGD scheduled for next month.  States symptoms are still triggered by stress.

## 2021-01-13 NOTE — PROGRESS NOTES
Subjective:     CC: follow-up nausea and vomiting    HPI:   Melissa presents today with     GERD (gastroesophageal reflux disease)  Chronic issue for last 2 years. Since last visit, she took famotidine for 1 week with some relief.  Then saw GI and they switched her to omeprazole with improvement in symptoms.  States her last episode of nausea and vomiting was one week ago and was only nausea.  She is stooling normally.  No blood stools.  She has and EGD scheduled for next month.  States symptoms are still triggered by stress.    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  States she feels more nausea when at work when stress is severe.  She is working with her boss to help adjust her work position, to help reduce the stress burden.  No SI/HI.  She is not exercise and not avoiding food triggers that worsen her GERD.  She would like to see a therapist to discuss methods to help reduce her anxiety and mild depression symptoms.      Past Medical History:   Diagnosis Date   • Gastritis        Social History     Tobacco Use   • Smoking status: Never Smoker   • Smokeless tobacco: Never Used   Substance Use Topics   • Alcohol use: Never     Frequency: Never     Drinks per session: 1 or 2     Binge frequency: Never   • Drug use: Yes     Types: Inhaled, Marijuana     Comment: thc       Current Outpatient Medications Ordered in Epic   Medication Sig Dispense Refill   • omeprazole (PRILOSEC) 20 MG delayed-release capsule Take 20 mg by mouth 2 (two) times a day.     • BLISOVI FE 1/20 1-20 MG-MCG per tablet      • ondansetron (ZOFRAN ODT) 4 MG TABLET DISPERSIBLE Take 1 Tab by mouth every 6 hours as needed for Nausea. 30 Tab 0     No current Epic-ordered facility-administered medications on file.        Allergies:  Patient has no known allergies.    Health Maintenance: requested pt bring her pap smear results to her next appt with me    ROS:  Gen: no  "fevers/chills, no changes in weight  Eyes: no changes in vision  ENT: no sore throat, no hearing loss, no bloody nose  Pulm: no sob, no cough  CV: no chest pain, no palpitations  GI: no diarrhea  : no dysuria  MSk: no myalgias  Skin: no rash  Neuro: no headaches, no numbness/tingling  Heme/Lymph: no easy bruising      Objective:       Exam:  /68 (BP Location: Right arm, Patient Position: Sitting, BP Cuff Size: Adult)   Pulse 87   Temp 37 °C (98.6 °F) (Temporal)   Resp 12   Ht 1.626 m (5' 4\")   Wt 64.4 kg (142 lb)   LMP 01/06/2021   SpO2 98%   BMI 24.37 kg/m²  Body mass index is 24.37 kg/m².    Gen: Alert and oriented, No apparent distress.  Neck: Neck is supple without lymphadenopathy.  Lungs: Normal effort, CTA bilaterally, no wheezes, rhonchi, or rales  CV: Regular rate and rhythm. No murmurs, rubs, or gallops.  Abd:  Nontender, normoactive bowel sounds.  Soft.  Ext: No clubbing, cyanosis, edema.  Psych Normal affect and mood. Linear thought process.  Good insight. No SI/Hi.    Assessment & Plan:     25 y.o. female with the following -     1. Gastroesophageal reflux disease, unspecified whether esophagitis present  Chronic, improving with omeprazole.  Continue PPI and pt to get EGD next month as scheduled.  Encouraged GERD prevention diet.  Encouraged treatment of her anxiety and depression as per belwo since this is likely worsening her GERD.  Follow-up precautions given for persistent and worsening symptoms.    2. Anxiety and depression  Chronic, worsening over the last 3 months after getting a promotion at work.  No SI/HI.  She declines medications.  Dietary, lifestyle and exercise modifications discussed.  referral given for CBT therapy.  Follow-up precautions given for persistent and worsening symptoms. Patient expressed understanding and agreement with plan.  - REFERRAL TO BEHAVIORAL HEALTH      Return in about 6 months (around 7/13/2021) for Medication review.    Please note that this " dictation was created using voice recognition software. I have made every reasonable attempt to correct obvious errors, but I expect that there are errors of grammar and possibly content that I did not discover before finalizing the note.

## 2021-04-14 ENCOUNTER — IMMUNIZATION (OUTPATIENT)
Dept: FAMILY PLANNING/WOMEN'S HEALTH CLINIC | Facility: IMMUNIZATION CENTER | Age: 26
End: 2021-04-14
Payer: COMMERCIAL

## 2021-04-14 DIAGNOSIS — Z23 ENCOUNTER FOR VACCINATION: Primary | ICD-10-CM

## 2021-04-14 PROCEDURE — 91300 PFIZER SARS-COV-2 VACCINE: CPT

## 2021-04-14 PROCEDURE — 0001A PFIZER SARS-COV-2 VACCINE: CPT

## 2021-05-06 ENCOUNTER — IMMUNIZATION (OUTPATIENT)
Dept: FAMILY PLANNING/WOMEN'S HEALTH CLINIC | Facility: IMMUNIZATION CENTER | Age: 26
End: 2021-05-06
Payer: COMMERCIAL

## 2021-05-06 DIAGNOSIS — Z23 ENCOUNTER FOR VACCINATION: Primary | ICD-10-CM

## 2021-05-06 PROCEDURE — 91300 PFIZER SARS-COV-2 VACCINE: CPT | Performed by: INTERNAL MEDICINE

## 2021-05-06 PROCEDURE — 0002A PFIZER SARS-COV-2 VACCINE: CPT | Performed by: INTERNAL MEDICINE

## 2021-07-27 NOTE — PROGRESS NOTES
"Subjective:     CC: medication follow-up    HPI:   Melissa presents today with     GERD (gastroesophageal reflux disease)  Chronic issue.  She has not had any significant episodes since last visit.  She is taking the omeprazole every other day without negative SEs.  She is not avoiding food triggers.  No significant nausea.  No recent vomiting.  No blood in stools.  No diarrhea or constipations.    Anxiety and depression  Chronic issue.  She reports her anxiety and depression have been doing well since last visit.  No Si/HI.  She has not seen a therapist, yet, and lost the referral paperwork.      Past Medical History:   Diagnosis Date   • Gastritis        Social History     Tobacco Use   • Smoking status: Never Smoker   • Smokeless tobacco: Never Used   Vaping Use   • Vaping Use: Some days   Substance Use Topics   • Alcohol use: Never   • Drug use: Yes     Types: Inhaled, Marijuana     Comment: thc       Current Outpatient Medications Ordered in Epic   Medication Sig Dispense Refill   • omeprazole (PRILOSEC) 20 MG delayed-release capsule Take 1 capsule by mouth 1 time a day as needed. 90 capsule 1   • BLISOVI FE 1/20 1-20 MG-MCG per tablet      • ondansetron (ZOFRAN ODT) 4 MG TABLET DISPERSIBLE Take 1 Tab by mouth every 6 hours as needed for Nausea. 30 Tab 0     No current Epic-ordered facility-administered medications on file.       Allergies:  Patient has no known allergies.    Health Maintenance: pt states she has been seeing gyn in the past for OCPs and pap smears and would like to continue with gyn regarding these matters.  Tdap given today    ROS:  Pulm: no sob, no cough  CV: no chest pain, no palpitations  GI: no nausea/vomiting, no diarrhea      Objective:       Exam:  /52 (BP Location: Right arm, Patient Position: Sitting, BP Cuff Size: Adult)   Pulse 100   Temp 36.6 °C (97.9 °F) (Temporal)   Resp 12   Ht 1.626 m (5' 4\")   Wt 66.2 kg (146 lb)   LMP 07/17/2021 (Exact Date)   SpO2 98%   BMI " 25.06 kg/m²  Body mass index is 25.06 kg/m².    Gen: Alert and oriented, No apparent distress.  Neck: Neck is supple without lymphadenopathy.  Lungs: Normal effort, CTA bilaterally, no wheezes, rhonchi, or rales  CV: Regular rate and rhythm. No murmurs, rubs, or gallops.  Abd Soft, nontender, nondistended.  Normoactive bowel sounds  Ext: No clubbing, cyanosis, edema.  Psych Normal affect and mood.  Linear thought process, good insight, well groomed.  No SI/HI. SHERON 7= 3.  PHQ9 =4    Labs: 11/2020 labs reviewed    Assessment & Plan:     26 y.o. female with the following -     1. Gastroesophageal reflux disease, unspecified whether esophagitis present  Chronic improved with omeprazole 20mg every other day.  Continue prn PPI use.  Encouraged GERD prevention diet.  Will monitor.  Follow-up precautions given for worsening symptoms.  - omeprazole (PRILOSEC) 20 MG delayed-release capsule; Take 1 capsule by mouth 1 time a day as needed.  Dispense: 90 capsule; Refill: 1    2. Anxiety and depression  Chronic issue, improved since last visit.   referral given to establish with therapist.  Encouraged regular exercise and well balanced diet.  Will monitor and follow-up precautions given for worsening symptoms.  - REFERRAL TO BEHAVIORAL HEALTH    3. Need for vaccination  Pt desires vaccination.  Risks and benefits discussed.  No contraindications today.  Vaccine given.  Follow-up precautions discussed.  - Tdap Vaccine =>8YO IM    4. Health care maintenance  Pt desires gyn referral for pap smear and birth control refills.  Referral given.  - REFERRAL TO GYNECOLOGY      Return in about 6 months (around 1/28/2022) for Annual.    Please note that this dictation was created using voice recognition software. I have made every reasonable attempt to correct obvious errors, but I expect that there are errors of grammar and possibly content that I did not discover before finalizing the note.

## 2021-07-28 ENCOUNTER — OFFICE VISIT (OUTPATIENT)
Dept: MEDICAL GROUP | Facility: MEDICAL CENTER | Age: 26
End: 2021-07-28
Payer: COMMERCIAL

## 2021-07-28 VITALS
RESPIRATION RATE: 12 BRPM | HEART RATE: 100 BPM | TEMPERATURE: 97.9 F | SYSTOLIC BLOOD PRESSURE: 104 MMHG | BODY MASS INDEX: 24.92 KG/M2 | WEIGHT: 146 LBS | OXYGEN SATURATION: 98 % | DIASTOLIC BLOOD PRESSURE: 52 MMHG | HEIGHT: 64 IN

## 2021-07-28 DIAGNOSIS — Z00.00 HEALTH CARE MAINTENANCE: ICD-10-CM

## 2021-07-28 DIAGNOSIS — Z23 NEED FOR VACCINATION: ICD-10-CM

## 2021-07-28 DIAGNOSIS — F41.9 ANXIETY AND DEPRESSION: ICD-10-CM

## 2021-07-28 DIAGNOSIS — K21.9 GASTROESOPHAGEAL REFLUX DISEASE, UNSPECIFIED WHETHER ESOPHAGITIS PRESENT: ICD-10-CM

## 2021-07-28 DIAGNOSIS — F32.A ANXIETY AND DEPRESSION: ICD-10-CM

## 2021-07-28 PROCEDURE — 90715 TDAP VACCINE 7 YRS/> IM: CPT | Performed by: FAMILY MEDICINE

## 2021-07-28 PROCEDURE — 90471 IMMUNIZATION ADMIN: CPT | Performed by: FAMILY MEDICINE

## 2021-07-28 PROCEDURE — 99213 OFFICE O/P EST LOW 20 MIN: CPT | Mod: 25 | Performed by: FAMILY MEDICINE

## 2021-07-28 RX ORDER — OMEPRAZOLE 20 MG/1
20 CAPSULE, DELAYED RELEASE ORAL
Qty: 90 CAPSULE | Refills: 1 | Status: SHIPPED | OUTPATIENT
Start: 2021-07-28

## 2021-07-28 ASSESSMENT — ANXIETY QUESTIONNAIRES
6. BECOMING EASILY ANNOYED OR IRRITABLE: SEVERAL DAYS
2. NOT BEING ABLE TO STOP OR CONTROL WORRYING: SEVERAL DAYS
4. TROUBLE RELAXING: NOT AT ALL
1. FEELING NERVOUS, ANXIOUS, OR ON EDGE: SEVERAL DAYS
7. FEELING AFRAID AS IF SOMETHING AWFUL MIGHT HAPPEN: NOT AT ALL
5. BEING SO RESTLESS THAT IT IS HARD TO SIT STILL: NOT AT ALL
GAD7 TOTAL SCORE: 3
3. WORRYING TOO MUCH ABOUT DIFFERENT THINGS: NOT AT ALL

## 2021-07-28 ASSESSMENT — PATIENT HEALTH QUESTIONNAIRE - PHQ9
CLINICAL INTERPRETATION OF PHQ2 SCORE: 1
5. POOR APPETITE OR OVEREATING: 0 - NOT AT ALL
SUM OF ALL RESPONSES TO PHQ QUESTIONS 1-9: 4

## 2021-07-28 ASSESSMENT — FIBROSIS 4 INDEX: FIB4 SCORE: 0.61

## 2021-07-28 NOTE — ASSESSMENT & PLAN NOTE
Chronic issue.  She has not had any significant episodes since last visit.  She is taking the omeprazole every other day without negative SEs.  She is not avoiding food triggers.  No significant nausea.  No recent vomiting.  No blood in stools.  No diarrhea or constipations.

## 2021-07-28 NOTE — PATIENT INSTRUCTIONS
Avoid red meat, fried foods, spicy foods, citrus, caffeine, chocolate, and tomatoes to improve your stomach upset.    Continue your current medications.    Return in 6 months for physical or sooner if you don't feel well

## 2021-07-28 NOTE — ASSESSMENT & PLAN NOTE
Chronic issue.  She reports her anxiety and depression have been doing well since last visit.  No Si/HI.  She has not seen a therapist, yet, and lost the referral paperwork.

## 2021-09-21 ENCOUNTER — OFFICE VISIT (OUTPATIENT)
Dept: BEHAVIORAL HEALTH | Facility: CLINIC | Age: 26
End: 2021-09-21
Payer: COMMERCIAL

## 2021-09-21 DIAGNOSIS — F41.9 ANXIETY AND DEPRESSION: ICD-10-CM

## 2021-09-21 DIAGNOSIS — F32.A ANXIETY AND DEPRESSION: ICD-10-CM

## 2021-09-21 DIAGNOSIS — F41.1 GENERALIZED ANXIETY DISORDER: ICD-10-CM

## 2021-09-21 PROCEDURE — 90791 PSYCH DIAGNOSTIC EVALUATION: CPT | Performed by: MARRIAGE & FAMILY THERAPIST

## 2021-09-21 ASSESSMENT — ANXIETY QUESTIONNAIRES
2. NOT BEING ABLE TO STOP OR CONTROL WORRYING: MORE THAN HALF THE DAYS
3. WORRYING TOO MUCH ABOUT DIFFERENT THINGS: SEVERAL DAYS
IF YOU CHECKED OFF ANY PROBLEMS ON THIS QUESTIONNAIRE, HOW DIFFICULT HAVE THESE PROBLEMS MADE IT FOR YOU TO DO YOUR WORK, TAKE CARE OF THINGS AT HOME, OR GET ALONG WITH OTHER PEOPLE: EXTREMELY DIFFICULT
GAD7 TOTAL SCORE: 9
5. BEING SO RESTLESS THAT IT IS HARD TO SIT STILL: SEVERAL DAYS
6. BECOMING EASILY ANNOYED OR IRRITABLE: NOT AT ALL
7. FEELING AFRAID AS IF SOMETHING AWFUL MIGHT HAPPEN: MORE THAN HALF THE DAYS
4. TROUBLE RELAXING: SEVERAL DAYS
1. FEELING NERVOUS, ANXIOUS, OR ON EDGE: MORE THAN HALF THE DAYS
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
## 2021-09-27 PROBLEM — F41.1 GENERALIZED ANXIETY DISORDER: Status: ACTIVE | Noted: 2021-09-27

## 2021-09-29 ENCOUNTER — GYNECOLOGY VISIT (OUTPATIENT)
Dept: OBGYN | Facility: CLINIC | Age: 26
End: 2021-09-29
Payer: COMMERCIAL

## 2021-09-29 ENCOUNTER — HOSPITAL ENCOUNTER (OUTPATIENT)
Facility: MEDICAL CENTER | Age: 26
End: 2021-09-29
Attending: ADVANCED PRACTICE MIDWIFE
Payer: COMMERCIAL

## 2021-09-29 VITALS — SYSTOLIC BLOOD PRESSURE: 129 MMHG | DIASTOLIC BLOOD PRESSURE: 81 MMHG | BODY MASS INDEX: 24.89 KG/M2 | WEIGHT: 145 LBS

## 2021-09-29 DIAGNOSIS — R53.83 FATIGUE, UNSPECIFIED TYPE: ICD-10-CM

## 2021-09-29 DIAGNOSIS — Z30.09 ENCOUNTER FOR OTHER GENERAL COUNSELING OR ADVICE ON CONTRACEPTION: ICD-10-CM

## 2021-09-29 DIAGNOSIS — Z01.419 ENCOUNTER FOR GYNECOLOGICAL EXAMINATION (GENERAL) (ROUTINE) WITHOUT ABNORMAL FINDINGS: ICD-10-CM

## 2021-09-29 PROCEDURE — 87591 N.GONORRHOEAE DNA AMP PROB: CPT

## 2021-09-29 PROCEDURE — 88175 CYTOPATH C/V AUTO FLUID REDO: CPT

## 2021-09-29 PROCEDURE — 99385 PREV VISIT NEW AGE 18-39: CPT | Performed by: ADVANCED PRACTICE MIDWIFE

## 2021-09-29 PROCEDURE — 87491 CHLMYD TRACH DNA AMP PROBE: CPT

## 2021-09-29 RX ORDER — DROSPIRENONE AND ETHINYL ESTRADIOL 0.02-3(28)
1 KIT ORAL DAILY
Qty: 84 TABLET | Refills: 4 | Status: SHIPPED | OUTPATIENT
Start: 2021-09-29 | End: 2022-11-29

## 2021-09-29 ASSESSMENT — FIBROSIS 4 INDEX: FIB4 SCORE: 0.61

## 2021-09-29 NOTE — NON-PROVIDER
Melissa Mckeon is a 26 y.o. female who presents for her Gynecologic Exam        HPI Comments: Pt presents for well woman exam. . Patient's last menstrual period was 09/21/2021. She reports regular menses that are 24 days in length, 5 bleeding days. No clots or heavy bleeding. She is using COCs (Blisovi 21-7) for birth control. Does not desire conception in the next year.  Patient denies report leaking of urine.    Patient reports severe abdominal pain and nausea first days of her cycle, which decreases to more standard cramping by end of period. Patient is concerned this could be endometriosis, which her sister was diagnosed with. Patient currently being worked up by GI for digestive issues, and she has a hard time differentiating between uterine and intestinal discomfort. Denies blood in stool or dypareunia. States her periods do not with align with pill pack.  Patient reports severe fatigue during period.       Review of Systems   Pertinent positives documented in HPI and all other systems reviewed & are negative    All PMH, PSH, allergies, social history and FH reviewed and updated today:  Past Medical History:   Diagnosis Date   • Gastritis    • Generalized anxiety disorder 9/27/2021     Past Surgical History:   Procedure Laterality Date   • DENTAL EXTRACTION(S)      widom teeth extraction     Patient has no known allergies.  Social History     Socioeconomic History   • Marital status: Single     Spouse name: Not on file   • Number of children: Not on file   • Years of education: Not on file   • Highest education level: Bachelor's degree (e.g., BA, AB, BS)   Occupational History   • Not on file   Tobacco Use   • Smoking status: Never Smoker   • Smokeless tobacco: Never Used   Vaping Use   • Vaping Use: Some days   • Substances: THC   Substance and Sexual Activity   • Alcohol use: Never   • Drug use: Yes     Types: Inhaled, Marijuana     Comment: thc   • Sexual activity: Yes     Partners: Male     Birth  control/protection: Pill   Other Topics Concern   • Not on file   Social History Narrative   • Not on file     Social Determinants of Health     Financial Resource Strain: Low Risk    • Difficulty of Paying Living Expenses: Not hard at all   Food Insecurity: No Food Insecurity   • Worried About Running Out of Food in the Last Year: Never true   • Ran Out of Food in the Last Year: Never true   Transportation Needs: No Transportation Needs   • Lack of Transportation (Medical): No   • Lack of Transportation (Non-Medical): No   Physical Activity: Inactive   • Days of Exercise per Week: 0 days   • Minutes of Exercise per Session: 0 min   Stress: Stress Concern Present   • Feeling of Stress : To some extent   Social Connections: Socially Isolated   • Frequency of Communication with Friends and Family: Once a week   • Frequency of Social Gatherings with Friends and Family: Once a week   • Attends [employer] Services: Never   • Active Member of Clubs or Organizations: No   • Attends Club or Organization Meetings: Never   • Marital Status: Living with partner   Intimate Partner Violence:    • Fear of Current or Ex-Partner:    • Emotionally Abused:    • Physically Abused:    • Sexually Abused:      Family History   Problem Relation Age of Onset   • Migraines Mother    • Thyroid Mother         hypothyroid   • Hypertension Father    • Diabetes Paternal Aunt    • Cancer Maternal Grandmother         breast cancer in her 60s     Medications:   Current Outpatient Medications Ordered in Epic   Medication Sig Dispense Refill   • drospirenone-ethinyl estradiol (MARY) 3-0.02 MG per tablet Take 1 Tablet by mouth every day. 84 Tablet 4   • omeprazole (PRILOSEC) 20 MG delayed-release capsule Take 1 capsule by mouth 1 time a day as needed. 90 capsule 1   • ondansetron (ZOFRAN ODT) 4 MG TABLET DISPERSIBLE Take 1 Tab by mouth every 6 hours as needed for Nausea. 30 Tab 0     No current Epic-ordered facility-administered medications on file.           Objective:   Vital measurements:  /81   Wt 65.8 kg (145 lb)   Body mass index is 24.89 kg/m². (Goal BM I>18 <25)    Physical Exam   Nursing note and vitals reviewed.  Constitutional: She is oriented to person, place, and time. She appears well-developed and well-nourished. No distress.     HEENT: Normocephalic and atraumatic. External ears normal. Nose normal. Conjunctivae and EOM are normal. Pupils are equal, round, and reactive to light. No scleral icterus.     Neck: Normal range of motion. Neck supple. No thyromegaly present.     Pulmonary/Chest: Effort normal and breath sounds clear in all quadrants. No respiratory distress.     Cardiovascular: Regular, rate and rhythm. No JVD.    Abdominal: Soft. Bowel sounds are normal. She exhibits no distension and no mass. No tenderness. She has no rebound and no guarding.     Breast: Deferred.    Genitourinary:  Pelvic exam was performed with patient supine.  External genitalia with no abnormal pigmentation, labial fusion,rash, tenderness, lesion or injury to the labia bilaterally.  Vagina is moist with no lesions, foul discharge, erythema, tenderness or bleeding. No foreign body around the vagina or signs of injury.   Cervix exhibits no motion tenderness, no discharge and no friability.   Rectal exam deferred    Musculoskeletal: Normal range of motion. She exhibits no edema and no tenderness.     Lymphadenopathy: She has no cervical adenopathy.     Neurological: She is alert and oriented to person, place, and time. She exhibits normal muscle tone.     Skin: Skin is warm and dry. No rash noted. She is not diaphoretic. No erythema. No pallor.     Psychiatric: She has a normal mood and affect. Her behavior is normal. Judgment and thought content normal.        Assessment:     1. Encounter for other general counseling or advice on contraception     2. Encounter for gynecological examination (general) (routine) without abnormal findings  THINPREP RFLX HPV ASCUS  W/CTNG   3. Fatigue, unspecified type  CBC WITH DIFFERENTIAL    TSH    VITAMIN D,25 HYDROXY         Plan:   1. Pap and physical exam performed. Discussed intervals of paps at current age per guidelines. Will notify of results  2. Discuss switching AMARJIT to a drosperinone containing pill, as this progesterone is indicated for dysmenorrhea and endometriosis. Switch to a 24-4 pack of Diana.    3. Patient states she is on last active pill of Blisovi pack. Instructed to start Diana active pills at this time.  This will make alignment of periods to pill pack more likely.  4. Discussed diagnosis of endometriosis typically a diagnosis of exclusion, with definitive diagnosis via laparoscopic visualization.   5. Lab slip given for CBC, TSH, vitamin D.   5. Encouraged general breast awareness and self breast exam if appropriate. We discussed recommendation of yearly mammogram at age 40.   6. Counseling: breast self exam and use and side effects of OCPs

## 2021-09-29 NOTE — NON-PROVIDER
Pt here for an Annual exam.     Pt states she would like to discuss her periods, and BC options.  Good# [phone]  LMP: 9/21/21  Pharmacy confirmed   Last PAP: Unknown   Last MAMMO: NA  Current BC method: Pills

## 2021-09-30 DIAGNOSIS — Z01.419 ENCOUNTER FOR GYNECOLOGICAL EXAMINATION (GENERAL) (ROUTINE) WITHOUT ABNORMAL FINDINGS: ICD-10-CM

## 2021-09-30 LAB
C TRACH DNA GENITAL QL NAA+PROBE: NEGATIVE
CYTOLOGY REG CYTOL: NORMAL
N GONORRHOEA DNA GENITAL QL NAA+PROBE: NEGATIVE
SPECIMEN SOURCE: NORMAL

## 2021-09-30 NOTE — PROGRESS NOTES
Melissa Mckeon is a 26 y.o. female who presents for her Gynecologic Exam         HPI Comments: Pt presents for well woman exam. . Patient's last menstrual period was 09/21/2021. She reports regular menses that are 24 days in length, 5 bleeding days. No clots or heavy bleeding. She is using COCs (Blisovi 21-7) for birth control. Does not desire conception in the next year.  Patient denies report leaking of urine.     Patient reports severe abdominal pain and nausea first days of her cycle, which decreases to more standard cramping by end of period. Patient is concerned this could be endometriosis, which her sister was diagnosed with. Patient currently being worked up by GI for digestive issues, and she has a hard time differentiating between uterine and intestinal discomfort. Denies blood in stool or dypareunia. States her periods do not with align with pill pack.  Patient reports severe fatigue during period.         Review of Systems   Pertinent positives documented in HPI and all other systems reviewed & are negative     All PMH, PSH, allergies, social history and FH reviewed and updated today:  Past Medical History        Past Medical History:   Diagnosis Date   • Gastritis     • Generalized anxiety disorder 9/27/2021         Past Surgical History         Past Surgical History:   Procedure Laterality Date   • DENTAL EXTRACTION(S)         widom teeth extraction         Patient has no known allergies.  Social History               Socioeconomic History   • Marital status: Single       Spouse name: Not on file   • Number of children: Not on file   • Years of education: Not on file   • Highest education level: Bachelor's degree (e.g., BA, AB, BS)   Occupational History   • Not on file   Tobacco Use   • Smoking status: Never Smoker   • Smokeless tobacco: Never Used   Vaping Use   • Vaping Use: Some days   • Substances: THC   Substance and Sexual Activity   • Alcohol use: Never   • Drug use: Yes       Types:  Inhaled, Marijuana       Comment: thc   • Sexual activity: Yes       Partners: Male       Birth control/protection: Pill   Other Topics Concern   • Not on file   Social History Narrative   • Not on file      Social Determinants of Health          Financial Resource Strain: Low Risk    • Difficulty of Paying Living Expenses: Not hard at all   Food Insecurity: No Food Insecurity   • Worried About Running Out of Food in the Last Year: Never true   • Ran Out of Food in the Last Year: Never true   Transportation Needs: No Transportation Needs   • Lack of Transportation (Medical): No   • Lack of Transportation (Non-Medical): No   Physical Activity: Inactive   • Days of Exercise per Week: 0 days   • Minutes of Exercise per Session: 0 min   Stress: Stress Concern Present   • Feeling of Stress : To some extent   Social Connections: Socially Isolated   • Frequency of Communication with Friends and Family: Once a week   • Frequency of Social Gatherings with Friends and Family: Once a week   • Attends [employer] Services: Never   • Active Member of Clubs or Organizations: No   • Attends Club or Organization Meetings: Never   • Marital Status: Living with partner   Intimate Partner Violence:    • Fear of Current or Ex-Partner:    • Emotionally Abused:    • Physically Abused:    • Sexually Abused:          Family History         Family History   Problem Relation Age of Onset   • Migraines Mother     • Thyroid Mother           hypothyroid   • Hypertension Father     • Diabetes Paternal Aunt     • Cancer Maternal Grandmother           breast cancer in her 60s         Medications:   Current Medications and Prescriptions Ordered in [facility]          Current Outpatient Medications Ordered in Epic   Medication Sig Dispense Refill   • drospirenone-ethinyl estradiol (MARY) 3-0.02 MG per tablet Take 1 Tablet by mouth every day. 84 Tablet 4   • omeprazole (PRILOSEC) 20 MG delayed-release capsule Take 1 capsule by mouth 1 time a day as needed. 90  capsule 1   • ondansetron (ZOFRAN ODT) 4 MG TABLET DISPERSIBLE Take 1 Tab by mouth every 6 hours as needed for Nausea. 30 Tab 0      No current [facility]-ordered facility-administered medications on file.                Objective:   Vital measurements:  /81   Wt 65.8 kg (145 lb)   Body mass index is 24.89 kg/m². (Goal BM I>18 <25)     Physical Exam   Nursing note and vitals reviewed.  Constitutional: She is oriented to person, place, and time. She appears well-developed and well-nourished. No distress.      HEENT: Normocephalic and atraumatic. External ears normal. Nose normal. Conjunctivae and EOM are normal. Pupils are equal, round, and reactive to light. No scleral icterus.      Neck: Normal range of motion. Neck supple. No thyromegaly present.      Pulmonary/Chest: Effort normal and breath sounds clear in all quadrants. No respiratory distress.      Cardiovascular: Regular, rate and rhythm. No JVD.     Abdominal: Soft. Bowel sounds are normal. She exhibits no distension and no mass. No tenderness. She has no rebound and no guarding.      Breast: Deferred.    Genitourinary:  Pelvic exam was performed with patient supine.  External genitalia with no abnormal pigmentation, labial fusion,rash, tenderness, lesion or injury to the labia bilaterally.  Vagina is moist with no lesions, foul discharge, erythema, tenderness or bleeding. No foreign body around the vagina or signs of injury.   Cervix exhibits no motion tenderness, no discharge and no friability.   Rectal exam deferred     Musculoskeletal: Normal range of motion. She exhibits no edema and no tenderness.     Lymphadenopathy: She has no cervical adenopathy.     Neurological: She is alert and oriented to person, place, and time. She exhibits normal muscle tone.      Skin: Skin is warm and dry. No rash noted. She is not diaphoretic. No erythema. No pallor.     Psychiatric: She has a normal mood and affect. Her behavior is normal. Judgment and thought content  normal.         Assessment:      1. Encounter for other general counseling or advice on contraception      2. Encounter for gynecological examination (general) (routine) without abnormal findings  THINPREP RFLX HPV ASCUS W/CTNG   3. Fatigue, unspecified type  CBC WITH DIFFERENTIAL     TSH     VITAMIN D,25 HYDROXY            Plan:   1. Pap and physical exam performed. Discussed intervals of paps at current age per guidelines. Will notify of results  2. Discuss switching AMARJIT to a drosperinone containing pill, as this progesterone is indicated for dysmenorrhea and endometriosis. Switch to a 24-4 pack of Diana to shorten bleeding days. Instructed to start Diana active pills at this time.  This will make alignment of periods to pill pack more likely.  3. Discussed diagnosis of endometriosis is typically a diagnosis of exclusion, with definitive diagnosis via laparoscopic visualization.   4. Lab slip given for CBC, TSH, vitamin D for patient complaints of fatigue.  5. Encouraged general breast awareness and self breast exam if appropriate. We discussed recommendation of yearly mammogram at age 40.   6. Follow up PRN or in 1 year.

## 2021-11-03 ENCOUNTER — HOSPITAL ENCOUNTER (OUTPATIENT)
Dept: LAB | Facility: MEDICAL CENTER | Age: 26
End: 2021-11-03
Attending: ADVANCED PRACTICE MIDWIFE
Payer: COMMERCIAL

## 2021-11-03 DIAGNOSIS — R53.83 FATIGUE, UNSPECIFIED TYPE: ICD-10-CM

## 2021-11-03 LAB
25(OH)D3 SERPL-MCNC: 54 NG/ML (ref 30–100)
BASOPHILS # BLD AUTO: 0.4 % (ref 0–1.8)
BASOPHILS # BLD: 0.04 K/UL (ref 0–0.12)
EOSINOPHIL # BLD AUTO: 0.42 K/UL (ref 0–0.51)
EOSINOPHIL NFR BLD: 4.7 % (ref 0–6.9)
ERYTHROCYTE [DISTWIDTH] IN BLOOD BY AUTOMATED COUNT: 39.1 FL (ref 35.9–50)
HCT VFR BLD AUTO: 49.4 % (ref 37–47)
HGB BLD-MCNC: 17.3 G/DL (ref 12–16)
IMM GRANULOCYTES # BLD AUTO: 0.03 K/UL (ref 0–0.11)
IMM GRANULOCYTES NFR BLD AUTO: 0.3 % (ref 0–0.9)
LYMPHOCYTES # BLD AUTO: 4.01 K/UL (ref 1–4.8)
LYMPHOCYTES NFR BLD: 44.7 % (ref 22–41)
MCH RBC QN AUTO: 31.5 PG (ref 27–33)
MCHC RBC AUTO-ENTMCNC: 35 G/DL (ref 33.6–35)
MCV RBC AUTO: 89.8 FL (ref 81.4–97.8)
MONOCYTES # BLD AUTO: 0.36 K/UL (ref 0–0.85)
MONOCYTES NFR BLD AUTO: 4 % (ref 0–13.4)
NEUTROPHILS # BLD AUTO: 4.11 K/UL (ref 2–7.15)
NEUTROPHILS NFR BLD: 45.9 % (ref 44–72)
NRBC # BLD AUTO: 0 K/UL
NRBC BLD-RTO: 0 /100 WBC
PLATELET # BLD AUTO: 270 K/UL (ref 164–446)
PMV BLD AUTO: 9.5 FL (ref 9–12.9)
RBC # BLD AUTO: 5.5 M/UL (ref 4.2–5.4)
TSH SERPL DL<=0.005 MIU/L-ACNC: 0.99 UIU/ML (ref 0.38–5.33)
WBC # BLD AUTO: 9 K/UL (ref 4.8–10.8)

## 2021-11-03 PROCEDURE — 36415 COLL VENOUS BLD VENIPUNCTURE: CPT

## 2021-11-03 PROCEDURE — 84443 ASSAY THYROID STIM HORMONE: CPT

## 2021-11-03 PROCEDURE — 85025 COMPLETE CBC W/AUTO DIFF WBC: CPT

## 2021-11-03 PROCEDURE — 82306 VITAMIN D 25 HYDROXY: CPT

## 2022-01-20 ENCOUNTER — APPOINTMENT (RX ONLY)
Dept: URBAN - METROPOLITAN AREA CLINIC 120 | Facility: CLINIC | Age: 27
Setting detail: DERMATOLOGY
End: 2022-01-20

## 2022-01-20 DIAGNOSIS — L81.4 OTHER MELANIN HYPERPIGMENTATION: ICD-10-CM

## 2022-01-20 DIAGNOSIS — D22 MELANOCYTIC NEVI: ICD-10-CM

## 2022-01-20 DIAGNOSIS — L21.8 OTHER SEBORRHEIC DERMATITIS: ICD-10-CM

## 2022-01-20 DIAGNOSIS — D485 NEOPLASM OF UNCERTAIN BEHAVIOR OF SKIN: ICD-10-CM

## 2022-01-20 DIAGNOSIS — B07.8 OTHER VIRAL WARTS: ICD-10-CM

## 2022-01-20 PROBLEM — D22.61 MELANOCYTIC NEVI OF RIGHT UPPER LIMB, INCLUDING SHOULDER: Status: ACTIVE | Noted: 2022-01-20

## 2022-01-20 PROBLEM — D22.62 MELANOCYTIC NEVI OF LEFT UPPER LIMB, INCLUDING SHOULDER: Status: ACTIVE | Noted: 2022-01-20

## 2022-01-20 PROBLEM — D22.5 MELANOCYTIC NEVI OF TRUNK: Status: ACTIVE | Noted: 2022-01-20

## 2022-01-20 PROBLEM — D22.39 MELANOCYTIC NEVI OF OTHER PARTS OF FACE: Status: ACTIVE | Noted: 2022-01-20

## 2022-01-20 PROBLEM — D22.71 MELANOCYTIC NEVI OF RIGHT LOWER LIMB, INCLUDING HIP: Status: ACTIVE | Noted: 2022-01-20

## 2022-01-20 PROBLEM — D22.72 MELANOCYTIC NEVI OF LEFT LOWER LIMB, INCLUDING HIP: Status: ACTIVE | Noted: 2022-01-20

## 2022-01-20 PROBLEM — D48.5 NEOPLASM OF UNCERTAIN BEHAVIOR OF SKIN: Status: ACTIVE | Noted: 2022-01-20

## 2022-01-20 PROCEDURE — ? COUNSELING

## 2022-01-20 PROCEDURE — ? SUNSCREEN RECOMMENDATIONS

## 2022-01-20 PROCEDURE — 99203 OFFICE O/P NEW LOW 30 MIN: CPT | Mod: 25

## 2022-01-20 PROCEDURE — 17110 DESTRUCTION B9 LES UP TO 14: CPT

## 2022-01-20 PROCEDURE — ? TREATMENT REGIMEN

## 2022-01-20 PROCEDURE — ? PRESCRIPTION

## 2022-01-20 PROCEDURE — ? CONSULTATION FOR EXCISION

## 2022-01-20 PROCEDURE — ? LIQUID NITROGEN

## 2022-01-20 RX ORDER — KETOCONAZOLE 20 MG/ML
SHAMPOO, SUSPENSION TOPICAL
Qty: 120 | Refills: 6 | Status: ERX | COMMUNITY
Start: 2022-01-20

## 2022-01-20 RX ORDER — FLUOCINOLONE ACETONIDE 0.1 MG/ML
SOLUTION TOPICAL
Qty: 60 | Refills: 3 | Status: ERX | COMMUNITY
Start: 2022-01-20

## 2022-01-20 RX ADMIN — KETOCONAZOLE: 20 SHAMPOO, SUSPENSION TOPICAL at 00:00

## 2022-01-20 RX ADMIN — FLUOCINOLONE ACETONIDE: 0.1 SOLUTION TOPICAL at 00:00

## 2022-01-20 ASSESSMENT — LOCATION ZONE DERM
LOCATION ZONE: TRUNK
LOCATION ZONE: FACE
LOCATION ZONE: FINGER
LOCATION ZONE: SCALP
LOCATION ZONE: ARM
LOCATION ZONE: LEG

## 2022-01-20 ASSESSMENT — LOCATION SIMPLE DESCRIPTION DERM
LOCATION SIMPLE: LEFT SHOULDER
LOCATION SIMPLE: LEFT CALF
LOCATION SIMPLE: ANTERIOR SCALP
LOCATION SIMPLE: LEFT UPPER ARM
LOCATION SIMPLE: RIGHT FOREHEAD
LOCATION SIMPLE: LEFT THIGH
LOCATION SIMPLE: RIGHT UPPER BACK
LOCATION SIMPLE: RIGHT MIDDLE FINGER
LOCATION SIMPLE: RIGHT CALF
LOCATION SIMPLE: RIGHT SHOULDER
LOCATION SIMPLE: ABDOMEN
LOCATION SIMPLE: RIGHT UPPER ARM

## 2022-01-20 ASSESSMENT — LOCATION DETAILED DESCRIPTION DERM
LOCATION DETAILED: LEFT ANTERIOR PROXIMAL THIGH
LOCATION DETAILED: RIGHT INFERIOR MEDIAL UPPER BACK
LOCATION DETAILED: LEFT ANTERIOR SHOULDER
LOCATION DETAILED: RIGHT DISTAL PALMAR MIDDLE FINGER
LOCATION DETAILED: LEFT DISTAL CALF
LOCATION DETAILED: RIGHT DISTAL CALF
LOCATION DETAILED: RIGHT ANTERIOR SHOULDER
LOCATION DETAILED: RIGHT ANTERIOR DISTAL UPPER ARM
LOCATION DETAILED: EPIGASTRIC SKIN
LOCATION DETAILED: RIGHT INFERIOR FOREHEAD
LOCATION DETAILED: MID-FRONTAL SCALP
LOCATION DETAILED: LEFT ANTERIOR DISTAL UPPER ARM

## 2022-01-20 NOTE — PROCEDURE: LIQUID NITROGEN
Number Of Freeze-Thaw Cycles: 2 freeze-thaw cycles
Detail Level: Detailed
Render Post-Care Instructions In Note?: no
Medical Necessity Information: It is in your best interest to select a reason for this procedure from the list below. All of these items fulfill various CMS LCD requirements except the new and changing color options.
Medical Necessity Clause: This procedure was medically necessary because the lesions that were treated were:
Spray Paint Text: The liquid nitrogen was applied to the skin utilizing a spray paint frosting technique.
Consent: The patient's consent was obtained including but not limited to risks of crusting, scabbing, blistering, scarring, darker or lighter pigmentary change, recurrence, incomplete removal and infection.
Show Topical Anesthesia Variable?: Yes
Post-Care Instructions: I reviewed with the patient in detail post-care instructions. Patient is to wear sunprotection, and avoid picking at any of the treated lesions. Pt may apply Vaseline to crusted or scabbing areas.

## 2022-01-20 NOTE — HPI: SKIN LESION
Is This A New Presentation, Or A Follow-Up?: Skin Lesion
Additional History: No other concerns. \\n\\nAlso mentioned she has very dry skin. Uses otc moisturizers and creams.

## 2022-01-20 NOTE — PROCEDURE: TREATMENT REGIMEN
Detail Level: Zone
Initiate Treatment: Ketoconazole 2% shampoo once weekly to scalp\\nFluocinolone 0.01% solution qd to scalp prn itching and scaling
Initiate Treatment: Salicylic Acid/5FU cream qhs to wart after 5-7 days

## 2022-01-29 SDOH — ECONOMIC STABILITY: INCOME INSECURITY: IN THE LAST 12 MONTHS, WAS THERE A TIME WHEN YOU WERE NOT ABLE TO PAY THE MORTGAGE OR RENT ON TIME?: NO

## 2022-01-29 SDOH — ECONOMIC STABILITY: INCOME INSECURITY: HOW HARD IS IT FOR YOU TO PAY FOR THE VERY BASICS LIKE FOOD, HOUSING, MEDICAL CARE, AND HEATING?: NOT HARD AT ALL

## 2022-01-29 SDOH — HEALTH STABILITY: PHYSICAL HEALTH: ON AVERAGE, HOW MANY DAYS PER WEEK DO YOU ENGAGE IN MODERATE TO STRENUOUS EXERCISE (LIKE A BRISK WALK)?: 0 DAYS

## 2022-01-29 SDOH — ECONOMIC STABILITY: HOUSING INSECURITY: IN THE LAST 12 MONTHS, HOW MANY PLACES HAVE YOU LIVED?: 1

## 2022-01-29 SDOH — ECONOMIC STABILITY: FOOD INSECURITY: WITHIN THE PAST 12 MONTHS, YOU WORRIED THAT YOUR FOOD WOULD RUN OUT BEFORE YOU GOT MONEY TO BUY MORE.: NEVER TRUE

## 2022-01-29 SDOH — ECONOMIC STABILITY: FOOD INSECURITY: WITHIN THE PAST 12 MONTHS, THE FOOD YOU BOUGHT JUST DIDN'T LAST AND YOU DIDN'T HAVE MONEY TO GET MORE.: NEVER TRUE

## 2022-01-29 SDOH — HEALTH STABILITY: MENTAL HEALTH
STRESS IS WHEN SOMEONE FEELS TENSE, NERVOUS, ANXIOUS, OR CAN'T SLEEP AT NIGHT BECAUSE THEIR MIND IS TROUBLED. HOW STRESSED ARE YOU?: ONLY A LITTLE

## 2022-01-29 SDOH — HEALTH STABILITY: PHYSICAL HEALTH: ON AVERAGE, HOW MANY MINUTES DO YOU ENGAGE IN EXERCISE AT THIS LEVEL?: 0 MIN

## 2022-01-29 SDOH — ECONOMIC STABILITY: TRANSPORTATION INSECURITY
IN THE PAST 12 MONTHS, HAS LACK OF TRANSPORTATION KEPT YOU FROM MEETINGS, WORK, OR FROM GETTING THINGS NEEDED FOR DAILY LIVING?: NO

## 2022-01-29 ASSESSMENT — SOCIAL DETERMINANTS OF HEALTH (SDOH)
IN A TYPICAL WEEK, HOW MANY TIMES DO YOU TALK ON THE PHONE WITH FAMILY, FRIENDS, OR NEIGHBORS?: ONCE A WEEK
HOW OFTEN DO YOU GET TOGETHER WITH FRIENDS OR RELATIVES?: ONCE A WEEK
HOW OFTEN DO YOU ATTENT MEETINGS OF THE CLUB OR ORGANIZATION YOU BELONG TO?: NEVER
IN A TYPICAL WEEK, HOW MANY TIMES DO YOU TALK ON THE PHONE WITH FAMILY, FRIENDS, OR NEIGHBORS?: ONCE A WEEK
HOW OFTEN DO YOU HAVE SIX OR MORE DRINKS ON ONE OCCASION: NEVER
ARE YOU MARRIED, WIDOWED, DIVORCED, SEPARATED, NEVER MARRIED, OR LIVING WITH A PARTNER?: LIVING WITH PARTNER
HOW OFTEN DO YOU GET TOGETHER WITH FRIENDS OR RELATIVES?: ONCE A WEEK
HOW OFTEN DO YOU ATTEND CHURCH OR RELIGIOUS SERVICES?: NEVER
HOW OFTEN DO YOU ATTENT MEETINGS OF THE CLUB OR ORGANIZATION YOU BELONG TO?: NEVER
DO YOU BELONG TO ANY CLUBS OR ORGANIZATIONS SUCH AS CHURCH GROUPS UNIONS, FRATERNAL OR ATHLETIC GROUPS, OR SCHOOL GROUPS?: NO
HOW OFTEN DO YOU ATTEND CHURCH OR RELIGIOUS SERVICES?: NEVER
ARE YOU MARRIED, WIDOWED, DIVORCED, SEPARATED, NEVER MARRIED, OR LIVING WITH A PARTNER?: LIVING WITH PARTNER
HOW OFTEN DO YOU HAVE A DRINK CONTAINING ALCOHOL: NEVER
HOW HARD IS IT FOR YOU TO PAY FOR THE VERY BASICS LIKE FOOD, HOUSING, MEDICAL CARE, AND HEATING?: NOT HARD AT ALL
DO YOU BELONG TO ANY CLUBS OR ORGANIZATIONS SUCH AS CHURCH GROUPS UNIONS, FRATERNAL OR ATHLETIC GROUPS, OR SCHOOL GROUPS?: NO
WITHIN THE PAST 12 MONTHS, YOU WORRIED THAT YOUR FOOD WOULD RUN OUT BEFORE YOU GOT THE MONEY TO BUY MORE: NEVER TRUE

## 2022-01-29 ASSESSMENT — LIFESTYLE VARIABLES
HOW OFTEN DO YOU HAVE SIX OR MORE DRINKS ON ONE OCCASION: NEVER
HOW OFTEN DO YOU HAVE A DRINK CONTAINING ALCOHOL: NEVER

## 2022-01-31 ENCOUNTER — OFFICE VISIT (OUTPATIENT)
Dept: MEDICAL GROUP | Facility: MEDICAL CENTER | Age: 27
End: 2022-01-31
Payer: COMMERCIAL

## 2022-01-31 VITALS
OXYGEN SATURATION: 99 % | RESPIRATION RATE: 12 BRPM | HEART RATE: 69 BPM | WEIGHT: 133 LBS | HEIGHT: 64 IN | SYSTOLIC BLOOD PRESSURE: 112 MMHG | TEMPERATURE: 98 F | BODY MASS INDEX: 22.71 KG/M2 | DIASTOLIC BLOOD PRESSURE: 70 MMHG

## 2022-01-31 DIAGNOSIS — J31.0 CHRONIC RHINITIS: ICD-10-CM

## 2022-01-31 DIAGNOSIS — R79.89 ABNORMAL CBC: ICD-10-CM

## 2022-01-31 DIAGNOSIS — Z00.00 HEALTHCARE MAINTENANCE: ICD-10-CM

## 2022-01-31 DIAGNOSIS — Z23 NEED FOR VACCINATION: ICD-10-CM

## 2022-01-31 PROCEDURE — 90471 IMMUNIZATION ADMIN: CPT | Performed by: FAMILY MEDICINE

## 2022-01-31 PROCEDURE — 99395 PREV VISIT EST AGE 18-39: CPT | Mod: 25 | Performed by: FAMILY MEDICINE

## 2022-01-31 PROCEDURE — 90651 9VHPV VACCINE 2/3 DOSE IM: CPT | Performed by: FAMILY MEDICINE

## 2022-01-31 RX ORDER — FLUTICASONE PROPIONATE 50 MCG
1 SPRAY, SUSPENSION (ML) NASAL DAILY
Qty: 16 G | Refills: 1 | Status: SHIPPED | OUTPATIENT
Start: 2022-01-31 | End: 2022-02-25

## 2022-01-31 ASSESSMENT — PATIENT HEALTH QUESTIONNAIRE - PHQ9: CLINICAL INTERPRETATION OF PHQ2 SCORE: 0

## 2022-01-31 ASSESSMENT — FIBROSIS 4 INDEX: FIB4 SCORE: 0.58

## 2022-01-31 NOTE — PROGRESS NOTES
Subjective:     CC:   Chief Complaint   Patient presents with   • Annual Exam       HPI:   Melissa Mckeon is a 26 y.o. female who presents for annual exam    Patient has GYN provider: Yes  Last Pap Smear: 10/2/21   H/O Abnormal Pap: No  Last Tdap: 7/28/21  Received HPV series: No    Exercise: not regularly  Diet: working to eat a well balanced    Anxiety and depression - states mood has improved since last visit.  No SI/HI.  She got engaged since last visit and is takling with family more and has change in job.    GERD - symptoms stopped since last visit and she is no longer taking omeprazole.    Periods are regular    OB History   No obstetric history on file.      She  reports being sexually active and has had partner(s) who are male. She reports using the following method of birth control/protection: Pill.    She  has a past medical history of Gastritis and Generalized anxiety disorder (9/27/2021).  She  has a past surgical history that includes dental extraction(s).    Family History   Problem Relation Age of Onset   • Migraines Mother    • Thyroid Mother         hypothyroid   • Hypertension Father    • Diabetes Paternal Aunt    • Cancer Maternal Grandmother         breast cancer in her 60s     Social History     Tobacco Use   • Smoking status: Never Smoker   • Smokeless tobacco: Never Used   Vaping Use   • Vaping Use: Some days   • Substances: THC   Substance Use Topics   • Alcohol use: Never   • Drug use: Yes     Types: Inhaled, Marijuana     Comment: thc       Patient Active Problem List    Diagnosis Date Noted   • Chronic rhinitis 01/31/2022   • Generalized anxiety disorder 09/27/2021   • Health care maintenance 07/28/2021   • Anxiety and depression 01/13/2021   • GERD (gastroesophageal reflux disease) 11/02/2020     Current Outpatient Medications   Medication Sig Dispense Refill   • fluticasone (FLONASE) 50 MCG/ACT nasal spray Administer 1 Spray into affected nostril(S) every day. 16 g 1   •  "drospirenone-ethinyl estradiol (MARY) 3-0.02 MG per tablet Take 1 Tablet by mouth every day. 84 Tablet 4   • omeprazole (PRILOSEC) 20 MG delayed-release capsule Take 1 capsule by mouth 1 time a day as needed. 90 capsule 1     No current facility-administered medications for this visit.     No Known Allergies    Review of Systems   Constitutional: Negative for fever, chills and malaise/fatigue.   HENT: Negative for congestion.    Eyes: Negative for pain.   Respiratory: Negative for cough and shortness of breath.    Cardiovascular: Negative for chest pain and leg swelling.   Gastrointestinal: Negative for nausea, vomiting, abdominal pain and diarrhea.   Genitourinary: Negative for dysuria and hematuria.   Skin: Negative for rash.   Neurological: Negative for dizziness, focal weakness and headaches.   Endo/Heme/Allergies: Does not bruise/bleed easily.   Psychiatric/Behavioral: Negative for depression.  The patient is not nervous/anxious.      Objective:   /70 (BP Location: Right arm, Patient Position: Sitting, BP Cuff Size: Adult)   Pulse 69   Temp 36.7 °C (98 °F) (Temporal)   Resp 12   Ht 1.626 m (5' 4\")   Wt 60.3 kg (133 lb)   LMP 01/20/2022   SpO2 99%   BMI 22.83 kg/m²     Wt Readings from Last 4 Encounters:   01/31/22 60.3 kg (133 lb)   09/29/21 65.8 kg (145 lb)   07/28/21 66.2 kg (146 lb)   01/13/21 64.4 kg (142 lb)       Physical Exam:  Constitutional: Well-developed and well-nourished. Not diaphoretic. No distress.   Skin: Skin is warm and dry. No rash noted.  Head: Atraumatic without lesions.  Eyes: Conjunctivae and extraocular motions are normal. Pupils are equal, round, and reactive to light. No scleral icterus.   Ears:  External ears unremarkable. Tympanic membranes clear and intact.  Nose: Nares patent. Septum midline. Turbinates pale with clear discharge  Mouth/Throat: OP examinations deferred given COVID19 exposure risk  Neck: Supple, trachea midline. Normal range of motion. No thyromegaly " present. No lymphadenopathy--cervical or supraclavicular.  Cardiovascular: Regular rate and rhythm, S1 and S2 without murmur, rubs, or gallops.    Respiratory: Effort normal. Clear to auscultation throughout. No adventitious sounds.   Abdomen: Soft, non tender, and without distention. Active bowel sounds. No rebound, guarding, masses or HSM.  Extremities: No cyanosis, clubbing, erythema, nor edema.   Musculoskeletal: All major joints AROM full in all directions without pain.  Neurological: Alert and oriented x 3. Grossly non-focal. Strength and sensation grossly intact.   Psychiatric:  Behavior, mood, and affect are appropriate.    Assessment and Plan:     1. Healthcare maintenance  Doing well.  Recent labs reviewed.  HM per below.  - 9VHPV Vaccine 2-3 Dose IM    2. Need for vaccination  Discussed risks and benefits of HPV vaccination.  Pt reports never having this vaccine in the past.  Discussed that vaccination for HPV from ages 26-45 is to be done only after shared clinical decision making and the ACS does not endose catch up vaccination after age 26.  Pt would like to proceed with starting HPV series.  First dose given today.  Pt to return in 1 and 6 months for next doses. Patient expressed understanding and agreement with plan.  - 9VHPV Vaccine 2-3 Dose IM    3. Chronic rhinitis  Chronic issue for years despite zyrtec, claritin use.  Start flonase.  SE profile discussed and follow-up precautions given  - fluticasone (FLONASE) 50 MCG/ACT nasal spray; Administer 1 Spray into affected nostril(S) every day.  Dispense: 16 g; Refill: 1    4. Abnormal CBC  Acute issue with elevated hemoglobin on recent labs.  Will recheck after patient is hydrated and treat based on results. Patient expressed understanding and agreement with plan.  - CBC WITH DIFFERENTIAL; Future    Health maintenance:     Labs per orders  Immunizations per orders  Patient counseled about skin care, diet, supplements, and exercise.  Discussed  STD  prevention     Follow-up: Return in about 4 weeks (around 2/28/2022) for HPV vaccine #2, 6 months follow-up for HPV vaccine #3, follow-up with PCP in 1 month for runny nose.

## 2022-02-01 NOTE — ASSESSMENT & PLAN NOTE
Chronic issue despite claritin, allegra, zyrtec.  Has daily symptoms for years.  No headaches or dizziness.  No frequent sneezing.  Symptoms worsen when leaning forward.

## 2022-02-14 ENCOUNTER — HOSPITAL ENCOUNTER (OUTPATIENT)
Dept: LAB | Facility: MEDICAL CENTER | Age: 27
End: 2022-02-14
Attending: FAMILY MEDICINE
Payer: COMMERCIAL

## 2022-02-14 DIAGNOSIS — R79.89 ABNORMAL CBC: ICD-10-CM

## 2022-02-14 LAB
BASOPHILS # BLD AUTO: 0.5 % (ref 0–1.8)
BASOPHILS # BLD: 0.03 K/UL (ref 0–0.12)
EOSINOPHIL # BLD AUTO: 0.19 K/UL (ref 0–0.51)
EOSINOPHIL NFR BLD: 3 % (ref 0–6.9)
ERYTHROCYTE [DISTWIDTH] IN BLOOD BY AUTOMATED COUNT: 37.8 FL (ref 35.9–50)
HCT VFR BLD AUTO: 46.5 % (ref 37–47)
HGB BLD-MCNC: 15.9 G/DL (ref 12–16)
IMM GRANULOCYTES # BLD AUTO: 0.01 K/UL (ref 0–0.11)
IMM GRANULOCYTES NFR BLD AUTO: 0.2 % (ref 0–0.9)
LYMPHOCYTES # BLD AUTO: 3.6 K/UL (ref 1–4.8)
LYMPHOCYTES NFR BLD: 56.3 % (ref 22–41)
MCH RBC QN AUTO: 30.6 PG (ref 27–33)
MCHC RBC AUTO-ENTMCNC: 34.2 G/DL (ref 33.6–35)
MCV RBC AUTO: 89.6 FL (ref 81.4–97.8)
MONOCYTES # BLD AUTO: 0.28 K/UL (ref 0–0.85)
MONOCYTES NFR BLD AUTO: 4.4 % (ref 0–13.4)
NEUTROPHILS # BLD AUTO: 2.29 K/UL (ref 2–7.15)
NEUTROPHILS NFR BLD: 35.6 % (ref 44–72)
NRBC # BLD AUTO: 0 K/UL
NRBC BLD-RTO: 0 /100 WBC
PLATELET # BLD AUTO: 275 K/UL (ref 164–446)
PMV BLD AUTO: 9.3 FL (ref 9–12.9)
RBC # BLD AUTO: 5.19 M/UL (ref 4.2–5.4)
WBC # BLD AUTO: 6.4 K/UL (ref 4.8–10.8)

## 2022-02-14 PROCEDURE — 36415 COLL VENOUS BLD VENIPUNCTURE: CPT

## 2022-02-14 PROCEDURE — 85025 COMPLETE CBC W/AUTO DIFF WBC: CPT

## 2022-02-17 ENCOUNTER — APPOINTMENT (RX ONLY)
Dept: URBAN - METROPOLITAN AREA CLINIC 120 | Facility: CLINIC | Age: 27
Setting detail: DERMATOLOGY
End: 2022-02-17

## 2022-02-17 DIAGNOSIS — B07.8 OTHER VIRAL WARTS: ICD-10-CM

## 2022-02-17 PROCEDURE — ? LIQUID NITROGEN

## 2022-02-17 PROCEDURE — ? TREATMENT REGIMEN

## 2022-02-17 PROCEDURE — 17110 DESTRUCTION B9 LES UP TO 14: CPT

## 2022-02-17 ASSESSMENT — LOCATION SIMPLE DESCRIPTION DERM: LOCATION SIMPLE: RIGHT MIDDLE FINGER

## 2022-02-17 ASSESSMENT — LOCATION ZONE DERM: LOCATION ZONE: FINGER

## 2022-02-17 ASSESSMENT — LOCATION DETAILED DESCRIPTION DERM: LOCATION DETAILED: RIGHT DISTAL PALMAR MIDDLE FINGER

## 2022-02-25 DIAGNOSIS — J31.0 CHRONIC RHINITIS: ICD-10-CM

## 2022-02-25 RX ORDER — FLUTICASONE PROPIONATE 50 MCG
1 SPRAY, SUSPENSION (ML) NASAL DAILY
Qty: 16 ML | Refills: 1 | Status: SHIPPED | OUTPATIENT
Start: 2022-02-25

## 2022-03-17 ENCOUNTER — APPOINTMENT (RX ONLY)
Dept: URBAN - METROPOLITAN AREA CLINIC 120 | Facility: CLINIC | Age: 27
Setting detail: DERMATOLOGY
End: 2022-03-17

## 2022-03-17 DIAGNOSIS — D485 NEOPLASM OF UNCERTAIN BEHAVIOR OF SKIN: ICD-10-CM

## 2022-03-17 PROBLEM — D48.5 NEOPLASM OF UNCERTAIN BEHAVIOR OF SKIN: Status: ACTIVE | Noted: 2022-03-17

## 2022-03-17 PROCEDURE — 12032 INTMD RPR S/A/T/EXT 2.6-7.5: CPT

## 2022-03-17 PROCEDURE — ? EXCISION

## 2022-03-17 PROCEDURE — 11403 EXC TR-EXT B9+MARG 2.1-3CM: CPT

## 2022-03-17 ASSESSMENT — LOCATION SIMPLE DESCRIPTION DERM: LOCATION SIMPLE: LEFT THIGH

## 2022-03-17 ASSESSMENT — LOCATION DETAILED DESCRIPTION DERM: LOCATION DETAILED: LEFT ANTERIOR PROXIMAL THIGH

## 2022-03-17 ASSESSMENT — LOCATION ZONE DERM: LOCATION ZONE: LEG

## 2022-03-17 NOTE — PROCEDURE: EXCISION
Medical Necessity Information: It is in your best interest to select a reason for this procedure from the list below. All of these items fulfill various CMS LCD requirements except lesion extends to a margin.
Include Z78.9 (Other Specified Conditions Influencing Health Status) As An Associated Diagnosis?: No
Medical Necessity Clause: This procedure was medically necessary because the lesion that was treated was:
Lab: [id number]
Lab Facility: [id number]
Surgeon (Optional): James Reid MD
Size Of Lesion In Cm: 1.8
X Size Of Lesion In Cm (Optional): 1.4
Size Of Margin In Cm: 0.2
Anesthesia Volume In Cc: 0
Eye Clamp Note Details: An eye clamp was used during the procedure.
Excision Method: Elliptical
Saucerization Depth: dermis and superficial adipose tissue
Repair Type: Intermediate
Suturegard Retention Suture: 2-0 Nylon
Retention Suture Bite Size: 3 mm
Length To Time In Minutes Device Was In Place: 10
Number Of Hemigard Strips Per Side: 1
Intermediate / Complex Repair - Final Wound Length In Cm: 3.7
Undermining Type: Entire Wound
Debridement Text: The wound edges were debrided prior to proceeding with the closure to facilitate wound healing.
Helical Rim Text: The closure involved the helical rim.
Vermilion Border Text: The closure involved the vermilion border.
Nostril Rim Text: The closure involved the nostril rim.
Retention Suture Text: Retention sutures were placed to support the closure and prevent dehiscence.
Suture Removal: 14 days
Epidermal Closure Graft Donor Site (Optional): simple interrupted
Graft Donor Site Bandage (Optional-Leave Blank If You Don't Want In Note): Steri-strips and a pressure bandage were applied to the donor site.
Detail Level: Detailed
Excision Depth: adipose tissue
Scalpel Size: 15 blade
Anesthesia Type: 1% lidocaine with epinephrine
Additional Anesthesia Volume In Cc: 6
Hemostasis: Electrocautery
Estimated Blood Loss (Cc): minimal
Deep Sutures: 4-0 Polysorb
Epidermal Sutures: 4-0 Nylon
Wound Care: Petrolatum
Dressing: dry sterile dressing
Suturegard Intro: Intraoperative tissue expansion was performed, utilizing the SUTUREGARD device, in order to reduce wound tension.
Suturegard Body: The suture ends were repeatedly re-tightened and re-clamped to achieve the desired tissue expansion.
Hemigard Intro: Due to skin fragility and wound tension, it was decided to use HEMIGARD adhesive retention suture devices to permit a linear closure. The skin was cleaned and dried for a 6cm distance away from the wound. Excessive hair, if present, was removed to allow for adhesion.
Hemigard Postcare Instructions: The HEMIGARD strips are to remain completely dry for at least 5-7 days.
Positioning (Leave Blank If You Do Not Want): The patient was placed in a comfortable position exposing the surgical site.
Complex Repair Preamble Text (Leave Blank If You Do Not Want): Extensive wide undermining was performed.
Intermediate Repair Preamble Text (Leave Blank If You Do Not Want): Undermining was performed with blunt dissection.
Fusiform Excision Additional Text (Leave Blank If You Do Not Want): The margin was drawn around the clinically apparent lesion.  A fusiform shape was then drawn on the skin incorporating the lesion and margins.  Incisions were then made along these lines to the appropriate tissue plane and the lesion was extirpated.
Eliptical Excision Additional Text (Leave Blank If You Do Not Want): The margin was drawn around the clinically apparent lesion.  An elliptical shape was then drawn on the skin incorporating the lesion and margins.  Incisions were then made along these lines to the appropriate tissue plane and the lesion was extirpated.
Saucerization Excision Additional Text (Leave Blank If You Do Not Want): The margin was drawn around the clinically apparent lesion.  Incisions were then made along these lines, in a tangential fashion, to the appropriate tissue plane and the lesion was extirpated.
Slit Excision Additional Text (Leave Blank If You Do Not Want): A linear line was drawn on the skin overlying the lesion. An incision was made slowly until the lesion was visualized.  Once visualized, the lesion was removed with blunt dissection.
Excisional Biopsy Additional Text (Leave Blank If You Do Not Want): The margin was drawn around the clinically apparent lesion. An elliptical shape was then drawn on the skin incorporating the lesion and margins.  Incisions were then made along these lines to the appropriate tissue plane and the lesion was extirpated.
Perilesional Excision Additional Text (Leave Blank If You Do Not Want): The margin was drawn around the clinically apparent lesion. Incisions were then made along these lines to the appropriate tissue plane and the lesion was extirpated.
Repair Performed By Another Provider Text (Leave Blank If You Do Not Want): After the tissue was excised the defect was repaired by another provider.
No Repair - Repaired With Adjacent Surgical Defect Text (Leave Blank If You Do Not Want): After the excision the defect was repaired concurrently with another surgical defect which was in close approximation.
Adjacent Tissue Transfer Text: The defect edges were debeveled with a #15 scalpel blade.  Given the location of the defect and the proximity to free margins an adjacent tissue transfer was deemed most appropriate.  Using a sterile surgical marker, an appropriate flap was drawn incorporating the defect and placing the expected incisions within the relaxed skin tension lines where possible.    The area thus outlined was incised deep to adipose tissue with a #15 scalpel blade.  The skin margins were undermined to an appropriate distance in all directions utilizing iris scissors.
Advancement Flap (Single) Text: The defect edges were debeveled with a #15 scalpel blade.  Given the location of the defect and the proximity to free margins a single advancement flap was deemed most appropriate.  Using a sterile surgical marker, an appropriate advancement flap was drawn incorporating the defect and placing the expected incisions within the relaxed skin tension lines where possible.    The area thus outlined was incised deep to adipose tissue with a #15 scalpel blade.  The skin margins were undermined to an appropriate distance in all directions utilizing iris scissors.
Advancement Flap (Double) Text: The defect edges were debeveled with a #15 scalpel blade.  Given the location of the defect and the proximity to free margins a double advancement flap was deemed most appropriate.  Using a sterile surgical marker, the appropriate advancement flaps were drawn incorporating the defect and placing the expected incisions within the relaxed skin tension lines where possible.    The area thus outlined was incised deep to adipose tissue with a #15 scalpel blade.  The skin margins were undermined to an appropriate distance in all directions utilizing iris scissors.
Burow's Advancement Flap Text: The defect edges were debeveled with a #15 scalpel blade.  Given the location of the defect and the proximity to free margins a Burow's advancement flap was deemed most appropriate.  Using a sterile surgical marker, the appropriate advancement flap was drawn incorporating the defect and placing the expected incisions within the relaxed skin tension lines where possible.    The area thus outlined was incised deep to adipose tissue with a #15 scalpel blade.  The skin margins were undermined to an appropriate distance in all directions utilizing iris scissors.
Chonodrocutaneous Helical Advancement Flap Text: The defect edges were debeveled with a #15 scalpel blade.  Given the location of the defect and the proximity to free margins a chondrocutaneous helical advancement flap was deemed most appropriate.  Using a sterile surgical marker, the appropriate advancement flap was drawn incorporating the defect and placing the expected incisions within the relaxed skin tension lines where possible.    The area thus outlined was incised deep to adipose tissue with a #15 scalpel blade.  The skin margins were undermined to an appropriate distance in all directions utilizing iris scissors.
Crescentic Advancement Flap Text: The defect edges were debeveled with a #15 scalpel blade.  Given the location of the defect and the proximity to free margins a crescentic advancement flap was deemed most appropriate.  Using a sterile surgical marker, the appropriate advancement flap was drawn incorporating the defect and placing the expected incisions within the relaxed skin tension lines where possible.    The area thus outlined was incised deep to adipose tissue with a #15 scalpel blade.  The skin margins were undermined to an appropriate distance in all directions utilizing iris scissors.
A-T Advancement Flap Text: The defect edges were debeveled with a #15 scalpel blade.  Given the location of the defect, shape of the defect and the proximity to free margins an A-T advancement flap was deemed most appropriate.  Using a sterile surgical marker, an appropriate advancement flap was drawn incorporating the defect and placing the expected incisions within the relaxed skin tension lines where possible.    The area thus outlined was incised deep to adipose tissue with a #15 scalpel blade.  The skin margins were undermined to an appropriate distance in all directions utilizing iris scissors.
O-T Advancement Flap Text: The defect edges were debeveled with a #15 scalpel blade.  Given the location of the defect, shape of the defect and the proximity to free margins an O-T advancement flap was deemed most appropriate.  Using a sterile surgical marker, an appropriate advancement flap was drawn incorporating the defect and placing the expected incisions within the relaxed skin tension lines where possible.    The area thus outlined was incised deep to adipose tissue with a #15 scalpel blade.  The skin margins were undermined to an appropriate distance in all directions utilizing iris scissors.
O-L Flap Text: The defect edges were debeveled with a #15 scalpel blade.  Given the location of the defect, shape of the defect and the proximity to free margins an O-L flap was deemed most appropriate.  Using a sterile surgical marker, an appropriate advancement flap was drawn incorporating the defect and placing the expected incisions within the relaxed skin tension lines where possible.    The area thus outlined was incised deep to adipose tissue with a #15 scalpel blade.  The skin margins were undermined to an appropriate distance in all directions utilizing iris scissors.
O-Z Flap Text: The defect edges were debeveled with a #15 scalpel blade.  Given the location of the defect, shape of the defect and the proximity to free margins an O-Z flap was deemed most appropriate.  Using a sterile surgical marker, an appropriate transposition flap was drawn incorporating the defect and placing the expected incisions within the relaxed skin tension lines where possible. The area thus outlined was incised deep to adipose tissue with a #15 scalpel blade.  The skin margins were undermined to an appropriate distance in all directions utilizing iris scissors.
Double O-Z Flap Text: The defect edges were debeveled with a #15 scalpel blade.  Given the location of the defect, shape of the defect and the proximity to free margins a Double O-Z flap was deemed most appropriate.  Using a sterile surgical marker, an appropriate transposition flap was drawn incorporating the defect and placing the expected incisions within the relaxed skin tension lines where possible. The area thus outlined was incised deep to adipose tissue with a #15 scalpel blade.  The skin margins were undermined to an appropriate distance in all directions utilizing iris scissors.
V-Y Flap Text: The defect edges were debeveled with a #15 scalpel blade.  Given the location of the defect, shape of the defect and the proximity to free margins a V-Y flap was deemed most appropriate.  Using a sterile surgical marker, an appropriate advancement flap was drawn incorporating the defect and placing the expected incisions within the relaxed skin tension lines where possible.    The area thus outlined was incised deep to adipose tissue with a #15 scalpel blade.  The skin margins were undermined to an appropriate distance in all directions utilizing iris scissors.
Advancement-Rotation Flap Text: The defect edges were debeveled with a #15 scalpel blade.  Given the location of the defect, shape of the defect and the proximity to free margins an advancement-rotation flap was deemed most appropriate.  Using a sterile surgical marker, an appropriate flap was drawn incorporating the defect and placing the expected incisions within the relaxed skin tension lines where possible. The area thus outlined was incised deep to adipose tissue with a #15 scalpel blade.  The skin margins were undermined to an appropriate distance in all directions utilizing iris scissors.
Mercedes Flap Text: The defect edges were debeveled with a #15 scalpel blade.  Given the location of the defect, shape of the defect and the proximity to free margins a Mercedes flap was deemed most appropriate.  Using a sterile surgical marker, an appropriate advancement flap was drawn incorporating the defect and placing the expected incisions within the relaxed skin tension lines where possible. The area thus outlined was incised deep to adipose tissue with a #15 scalpel blade.  The skin margins were undermined to an appropriate distance in all directions utilizing iris scissors.
Modified Advancement Flap Text: The defect edges were debeveled with a #15 scalpel blade.  Given the location of the defect, shape of the defect and the proximity to free margins a modified advancement flap was deemed most appropriate.  Using a sterile surgical marker, an appropriate advancement flap was drawn incorporating the defect and placing the expected incisions within the relaxed skin tension lines where possible.    The area thus outlined was incised deep to adipose tissue with a #15 scalpel blade.  The skin margins were undermined to an appropriate distance in all directions utilizing iris scissors.
Mucosal Advancement Flap Text: Given the location of the defect, shape of the defect and the proximity to free margins a mucosal advancement flap was deemed most appropriate. Incisions were made with a 15 blade scalpel in the appropriate fashion along the cutaneous vermilion border and the mucosal lip. The remaining actinically damaged mucosal tissue was excised.  The mucosal advancement flap was then elevated to the gingival sulcus with care taken to preserve the neurovascular structures and advanced into the primary defect. Care was taken to ensure that precise realignment of the vermilion border was achieved.
Peng Advancement Flap Text: The defect edges were debeveled with a #15 scalpel blade.  Given the location of the defect, shape of the defect and the proximity to free margins a Peng advancement flap was deemed most appropriate.  Using a sterile surgical marker, an appropriate advancement flap was drawn incorporating the defect and placing the expected incisions within the relaxed skin tension lines where possible. The area thus outlined was incised deep to adipose tissue with a #15 scalpel blade.  The skin margins were undermined to an appropriate distance in all directions utilizing iris scissors.
Hatchet Flap Text: The defect edges were debeveled with a #15 scalpel blade.  Given the location of the defect, shape of the defect and the proximity to free margins a hatchet flap was deemed most appropriate.  Using a sterile surgical marker, an appropriate hatchet flap was drawn incorporating the defect and placing the expected incisions within the relaxed skin tension lines where possible.    The area thus outlined was incised deep to adipose tissue with a #15 scalpel blade.  The skin margins were undermined to an appropriate distance in all directions utilizing iris scissors.
Rotation Flap Text: The defect edges were debeveled with a #15 scalpel blade.  Given the location of the defect, shape of the defect and the proximity to free margins a rotation flap was deemed most appropriate.  Using a sterile surgical marker, an appropriate rotation flap was drawn incorporating the defect and placing the expected incisions within the relaxed skin tension lines where possible.    The area thus outlined was incised deep to adipose tissue with a #15 scalpel blade.  The skin margins were undermined to an appropriate distance in all directions utilizing iris scissors.
Spiral Flap Text: The defect edges were debeveled with a #15 scalpel blade.  Given the location of the defect, shape of the defect and the proximity to free margins a spiral flap was deemed most appropriate.  Using a sterile surgical marker, an appropriate rotation flap was drawn incorporating the defect and placing the expected incisions within the relaxed skin tension lines where possible. The area thus outlined was incised deep to adipose tissue with a #15 scalpel blade.  The skin margins were undermined to an appropriate distance in all directions utilizing iris scissors.
Staged Advancement Flap Text: The defect edges were debeveled with a #15 scalpel blade.  Given the location of the defect, shape of the defect and the proximity to free margins a staged advancement flap was deemed most appropriate.  Using a sterile surgical marker, an appropriate advancement flap was drawn incorporating the defect and placing the expected incisions within the relaxed skin tension lines where possible. The area thus outlined was incised deep to adipose tissue with a #15 scalpel blade.  The skin margins were undermined to an appropriate distance in all directions utilizing iris scissors.
Star Wedge Flap Text: The defect edges were debeveled with a #15 scalpel blade.  Given the location of the defect, shape of the defect and the proximity to free margins a star wedge flap was deemed most appropriate.  Using a sterile surgical marker, an appropriate rotation flap was drawn incorporating the defect and placing the expected incisions within the relaxed skin tension lines where possible. The area thus outlined was incised deep to adipose tissue with a #15 scalpel blade.  The skin margins were undermined to an appropriate distance in all directions utilizing iris scissors.
Transposition Flap Text: The defect edges were debeveled with a #15 scalpel blade.  Given the location of the defect and the proximity to free margins a transposition flap was deemed most appropriate.  Using a sterile surgical marker, an appropriate transposition flap was drawn incorporating the defect.    The area thus outlined was incised deep to adipose tissue with a #15 scalpel blade.  The skin margins were undermined to an appropriate distance in all directions utilizing iris scissors.
Muscle Hinge Flap Text: The defect edges were debeveled with a #15 scalpel blade.  Given the size, depth and location of the defect and the proximity to free margins a muscle hinge flap was deemed most appropriate.  Using a sterile surgical marker, an appropriate hinge flap was drawn incorporating the defect. The area thus outlined was incised with a #15 scalpel blade.  The skin margins were undermined to an appropriate distance in all directions utilizing iris scissors.
Mustarde Flap Text: The defect edges were debeveled with a #15 scalpel blade.  Given the size, depth and location of the defect and the proximity to free margins a Mustarde flap was deemed most appropriate.  Using a sterile surgical marker, an appropriate flap was drawn incorporating the defect. The area thus outlined was incised with a #15 scalpel blade.  The skin margins were undermined to an appropriate distance in all directions utilizing iris scissors.
Nasal Turnover Hinge Flap Text: The defect edges were debeveled with a #15 scalpel blade.  Given the size, depth, location of the defect and the defect being full thickness a nasal turnover hinge flap was deemed most appropriate.  Using a sterile surgical marker, an appropriate hinge flap was drawn incorporating the defect. The area thus outlined was incised with a #15 scalpel blade. The flap was designed to recreate the nasal mucosal lining and the alar rim. The skin margins were undermined to an appropriate distance in all directions utilizing iris scissors.
Nasalis-Muscle-Based Myocutaneous Island Pedicle Flap Text: Using a #15 blade, an incision was made around the donor flap to the level of the nasalis muscle. Wide lateral undermining was then performed in both the subcutaneous plane above the nasalis muscle, and in a submuscular plane just above periosteum. This allowed the formation of a free nasalis muscle axial pedicle (based on the angular artery) which was still attached to the actual cutaneous flap, increasing its mobility and vascular viability. Hemostasis was obtained with pinpoint electrocoagulation. The flap was mobilized into position and the pivotal anchor points positioned and stabilized with buried interrupted sutures. Subcutaneous and dermal tissues were closed in a multilayered fashion with sutures. Tissue redundancies were excised, and the epidermal edges were apposed without significant tension and sutured with sutures.
Orbicularis Oris Muscle Flap Text: The defect edges were debeveled with a #15 scalpel blade.  Given that the defect affected the competency of the oral sphincter an orbicularis oris muscle flap was deemed most appropriate to restore this competency and normal muscle function.  Using a sterile surgical marker, an appropriate flap was drawn incorporating the defect. The area thus outlined was incised with a #15 scalpel blade.
Melolabial Transposition Flap Text: The defect edges were debeveled with a #15 scalpel blade.  Given the location of the defect and the proximity to free margins a melolabial flap was deemed most appropriate.  Using a sterile surgical marker, an appropriate melolabial transposition flap was drawn incorporating the defect.    The area thus outlined was incised deep to adipose tissue with a #15 scalpel blade.  The skin margins were undermined to an appropriate distance in all directions utilizing iris scissors.
Rhombic Flap Text: The defect edges were debeveled with a #15 scalpel blade.  Given the location of the defect and the proximity to free margins a rhombic flap was deemed most appropriate.  Using a sterile surgical marker, an appropriate rhombic flap was drawn incorporating the defect.    The area thus outlined was incised deep to adipose tissue with a #15 scalpel blade.  The skin margins were undermined to an appropriate distance in all directions utilizing iris scissors.
Rhomboid Transposition Flap Text: The defect edges were debeveled with a #15 scalpel blade.  Given the location of the defect and the proximity to free margins a rhomboid transposition flap was deemed most appropriate.  Using a sterile surgical marker, an appropriate rhomboid flap was drawn incorporating the defect.    The area thus outlined was incised deep to adipose tissue with a #15 scalpel blade.  The skin margins were undermined to an appropriate distance in all directions utilizing iris scissors.
Bi-Rhombic Flap Text: The defect edges were debeveled with a #15 scalpel blade.  Given the location of the defect and the proximity to free margins a bi-rhombic flap was deemed most appropriate.  Using a sterile surgical marker, an appropriate rhombic flap was drawn incorporating the defect. The area thus outlined was incised deep to adipose tissue with a #15 scalpel blade.  The skin margins were undermined to an appropriate distance in all directions utilizing iris scissors.
Helical Rim Advancement Flap Text: The defect edges were debeveled with a #15 blade scalpel.  Given the location of the defect and the proximity to free margins (helical rim) a double helical rim advancement flap was deemed most appropriate.  Using a sterile surgical marker, the appropriate advancement flaps were drawn incorporating the defect and placing the expected incisions between the helical rim and antihelix where possible.  The area thus outlined was incised through and through with a #15 scalpel blade.  With a skin hook and iris scissors, the flaps were gently and sharply undermined and freed up.
Bilateral Helical Rim Advancement Flap Text: The defect edges were debeveled with a #15 blade scalpel.  Given the location of the defect and the proximity to free margins (helical rim) a bilateral helical rim advancement flap was deemed most appropriate.  Using a sterile surgical marker, the appropriate advancement flaps were drawn incorporating the defect and placing the expected incisions between the helical rim and antihelix where possible.  The area thus outlined was incised through and through with a #15 scalpel blade.  With a skin hook and iris scissors, the flaps were gently and sharply undermined and freed up.
Ear Star Wedge Flap Text: The defect edges were debeveled with a #15 blade scalpel.  Given the location of the defect and the proximity to free margins (helical rim) an ear star wedge flap was deemed most appropriate.  Using a sterile surgical marker, the appropriate flap was drawn incorporating the defect and placing the expected incisions between the helical rim and antihelix where possible.  The area thus outlined was incised through and through with a #15 scalpel blade.
Banner Transposition Flap Text: The defect edges were debeveled with a #15 scalpel blade.  Given the location of the defect and the proximity to free margins a Banner transposition flap was deemed most appropriate.  Using a sterile surgical marker, an appropriate flap drawn around the defect. The area thus outlined was incised deep to adipose tissue with a #15 scalpel blade.  The skin margins were undermined to an appropriate distance in all directions utilizing iris scissors.
Bilobed Flap Text: The defect edges were debeveled with a #15 scalpel blade.  Given the location of the defect and the proximity to free margins a bilobe flap was deemed most appropriate.  Using a sterile surgical marker, an appropriate bilobe flap drawn around the defect.    The area thus outlined was incised deep to adipose tissue with a #15 scalpel blade.  The skin margins were undermined to an appropriate distance in all directions utilizing iris scissors.
Bilobed Transposition Flap Text: The defect edges were debeveled with a #15 scalpel blade.  Given the location of the defect and the proximity to free margins a bilobed transposition flap was deemed most appropriate.  Using a sterile surgical marker, an appropriate bilobe flap drawn around the defect.    The area thus outlined was incised deep to adipose tissue with a #15 scalpel blade.  The skin margins were undermined to an appropriate distance in all directions utilizing iris scissors.
Trilobed Flap Text: The defect edges were debeveled with a #15 scalpel blade.  Given the location of the defect and the proximity to free margins a trilobed flap was deemed most appropriate.  Using a sterile surgical marker, an appropriate trilobed flap drawn around the defect.    The area thus outlined was incised deep to adipose tissue with a #15 scalpel blade.  The skin margins were undermined to an appropriate distance in all directions utilizing iris scissors.
Dorsal Nasal Flap Text: The defect edges were debeveled with a #15 scalpel blade.  Given the location of the defect and the proximity to free margins a dorsal nasal flap was deemed most appropriate.  Using a sterile surgical marker, an appropriate dorsal nasal flap was drawn around the defect.    The area thus outlined was incised deep to adipose tissue with a #15 scalpel blade.  The skin margins were undermined to an appropriate distance in all directions utilizing iris scissors.
Island Pedicle Flap Text: The defect edges were debeveled with a #15 scalpel blade.  Given the location of the defect, shape of the defect and the proximity to free margins an island pedicle advancement flap was deemed most appropriate.  Using a sterile surgical marker, an appropriate advancement flap was drawn incorporating the defect, outlining the appropriate donor tissue and placing the expected incisions within the relaxed skin tension lines where possible.    The area thus outlined was incised deep to adipose tissue with a #15 scalpel blade.  The skin margins were undermined to an appropriate distance in all directions around the primary defect and laterally outward around the island pedicle utilizing iris scissors.  There was minimal undermining beneath the pedicle flap.
Island Pedicle Flap With Canthal Suspension Text: The defect edges were debeveled with a #15 scalpel blade.  Given the location of the defect, shape of the defect and the proximity to free margins an island pedicle advancement flap was deemed most appropriate.  Using a sterile surgical marker, an appropriate advancement flap was drawn incorporating the defect, outlining the appropriate donor tissue and placing the expected incisions within the relaxed skin tension lines where possible. The area thus outlined was incised deep to adipose tissue with a #15 scalpel blade.  The skin margins were undermined to an appropriate distance in all directions around the primary defect and laterally outward around the island pedicle utilizing iris scissors.  There was minimal undermining beneath the pedicle flap. A suspension suture was placed in the canthal tendon to prevent tension and prevent ectropion.
Alar Island Pedicle Flap Text: The defect edges were debeveled with a #15 scalpel blade.  Given the location of the defect, shape of the defect and the proximity to the alar rim an island pedicle advancement flap was deemed most appropriate.  Using a sterile surgical marker, an appropriate advancement flap was drawn incorporating the defect, outlining the appropriate donor tissue and placing the expected incisions within the nasal ala running parallel to the alar rim. The area thus outlined was incised with a #15 scalpel blade.  The skin margins were undermined minimally to an appropriate distance in all directions around the primary defect and laterally outward around the island pedicle utilizing iris scissors.  There was minimal undermining beneath the pedicle flap.
Double Island Pedicle Flap Text: The defect edges were debeveled with a #15 scalpel blade.  Given the location of the defect, shape of the defect and the proximity to free margins a double island pedicle advancement flap was deemed most appropriate.  Using a sterile surgical marker, an appropriate advancement flap was drawn incorporating the defect, outlining the appropriate donor tissue and placing the expected incisions within the relaxed skin tension lines where possible.    The area thus outlined was incised deep to adipose tissue with a #15 scalpel blade.  The skin margins were undermined to an appropriate distance in all directions around the primary defect and laterally outward around the island pedicle utilizing iris scissors.  There was minimal undermining beneath the pedicle flap.
Island Pedicle Flap-Requiring Vessel Identification Text: The defect edges were debeveled with a #15 scalpel blade.  Given the location of the defect, shape of the defect and the proximity to free margins an island pedicle advancement flap was deemed most appropriate.  Using a sterile surgical marker, an appropriate advancement flap was drawn, based on the axial vessel mentioned above, incorporating the defect, outlining the appropriate donor tissue and placing the expected incisions within the relaxed skin tension lines where possible.    The area thus outlined was incised deep to adipose tissue with a #15 scalpel blade.  The skin margins were undermined to an appropriate distance in all directions around the primary defect and laterally outward around the island pedicle utilizing iris scissors.  There was minimal undermining beneath the pedicle flap.
Keystone Flap Text: The defect edges were debeveled with a #15 scalpel blade.  Given the location of the defect, shape of the defect a keystone flap was deemed most appropriate.  Using a sterile surgical marker, an appropriate keystone flap was drawn incorporating the defect, outlining the appropriate donor tissue and placing the expected incisions within the relaxed skin tension lines where possible. The area thus outlined was incised deep to adipose tissue with a #15 scalpel blade.  The skin margins were undermined to an appropriate distance in all directions around the primary defect and laterally outward around the flap utilizing iris scissors.
O-T Plasty Text: The defect edges were debeveled with a #15 scalpel blade.  Given the location of the defect, shape of the defect and the proximity to free margins an O-T plasty was deemed most appropriate.  Using a sterile surgical marker, an appropriate O-T plasty was drawn incorporating the defect and placing the expected incisions within the relaxed skin tension lines where possible.    The area thus outlined was incised deep to adipose tissue with a #15 scalpel blade.  The skin margins were undermined to an appropriate distance in all directions utilizing iris scissors.
O-Z Plasty Text: The defect edges were debeveled with a #15 scalpel blade.  Given the location of the defect, shape of the defect and the proximity to free margins an O-Z plasty (double transposition flap) was deemed most appropriate.  Using a sterile surgical marker, the appropriate transposition flaps were drawn incorporating the defect and placing the expected incisions within the relaxed skin tension lines where possible.    The area thus outlined was incised deep to adipose tissue with a #15 scalpel blade.  The skin margins were undermined to an appropriate distance in all directions utilizing iris scissors.  Hemostasis was achieved with electrocautery.  The flaps were then transposed into place, one clockwise and the other counterclockwise, and anchored with interrupted buried subcutaneous sutures.
Double O-Z Plasty Text: The defect edges were debeveled with a #15 scalpel blade.  Given the location of the defect, shape of the defect and the proximity to free margins a Double O-Z plasty (double transposition flap) was deemed most appropriate.  Using a sterile surgical marker, the appropriate transposition flaps were drawn incorporating the defect and placing the expected incisions within the relaxed skin tension lines where possible. The area thus outlined was incised deep to adipose tissue with a #15 scalpel blade.  The skin margins were undermined to an appropriate distance in all directions utilizing iris scissors.  Hemostasis was achieved with electrocautery.  The flaps were then transposed into place, one clockwise and the other counterclockwise, and anchored with interrupted buried subcutaneous sutures.
V-Y Plasty Text: The defect edges were debeveled with a #15 scalpel blade.  Given the location of the defect, shape of the defect and the proximity to free margins an V-Y advancement flap was deemed most appropriate.  Using a sterile surgical marker, an appropriate advancement flap was drawn incorporating the defect and placing the expected incisions within the relaxed skin tension lines where possible.    The area thus outlined was incised deep to adipose tissue with a #15 scalpel blade.  The skin margins were undermined to an appropriate distance in all directions utilizing iris scissors.
H Plasty Text: Given the location of the defect, shape of the defect and the proximity to free margins a H-plasty was deemed most appropriate for repair.  Using a sterile surgical marker, the appropriate advancement arms of the H-plasty were drawn incorporating the defect and placing the expected incisions within the relaxed skin tension lines where possible. The area thus outlined was incised deep to adipose tissue with a #15 scalpel blade. The skin margins were undermined to an appropriate distance in all directions utilizing iris scissors.  The opposing advancement arms were then advanced into place in opposite direction and anchored with interrupted buried subcutaneous sutures.
W Plasty Text: The lesion was extirpated to the level of the fat with a #15 scalpel blade.  Given the location of the defect, shape of the defect and the proximity to free margins a W-plasty was deemed most appropriate for repair.  Using a sterile surgical marker, the appropriate transposition arms of the W-plasty were drawn incorporating the defect and placing the expected incisions within the relaxed skin tension lines where possible.    The area thus outlined was incised deep to adipose tissue with a #15 scalpel blade.  The skin margins were undermined to an appropriate distance in all directions utilizing iris scissors.  The opposing transposition arms were then transposed into place in opposite direction and anchored with interrupted buried subcutaneous sutures.
Z Plasty Text: The lesion was extirpated to the level of the fat with a #15 scalpel blade.  Given the location of the defect, shape of the defect and the proximity to free margins a Z-plasty was deemed most appropriate for repair.  Using a sterile surgical marker, the appropriate transposition arms of the Z-plasty were drawn incorporating the defect and placing the expected incisions within the relaxed skin tension lines where possible.    The area thus outlined was incised deep to adipose tissue with a #15 scalpel blade.  The skin margins were undermined to an appropriate distance in all directions utilizing iris scissors.  The opposing transposition arms were then transposed into place in opposite direction and anchored with interrupted buried subcutaneous sutures.
Zygomaticofacial Flap Text: Given the location of the defect, shape of the defect and the proximity to free margins a zygomaticofacial flap was deemed most appropriate for repair.  Using a sterile surgical marker, the appropriate flap was drawn incorporating the defect and placing the expected incisions within the relaxed skin tension lines where possible. The area thus outlined was incised deep to adipose tissue with a #15 scalpel blade with preservation of a vascular pedicle.  The skin margins were undermined to an appropriate distance in all directions utilizing iris scissors.  The flap was then placed into the defect and anchored with interrupted buried subcutaneous sutures.
Cheek Interpolation Flap Text: A decision was made to reconstruct the defect utilizing an interpolation axial flap and a staged reconstruction.  A telfa template was made of the defect.  This telfa template was then used to outline the Cheek Interpolation flap.  The donor area for the pedicle flap was then injected with anesthesia.  The flap was excised through the skin and subcutaneous tissue down to the layer of the underlying musculature.  The interpolation flap was carefully excised within this deep plane to maintain its blood supply.  The edges of the donor site were undermined.   The donor site was closed in a primary fashion.  The pedicle was then rotated into position and sutured.  Once the tube was sutured into place, adequate blood supply was confirmed with blanching and refill.  The pedicle was then wrapped with xeroform gauze and dressed appropriately with a telfa and gauze bandage to ensure continued blood supply and protect the attached pedicle.
Cheek-To-Nose Interpolation Flap Text: A decision was made to reconstruct the defect utilizing an interpolation axial flap and a staged reconstruction.  A telfa template was made of the defect.  This telfa template was then used to outline the Cheek-To-Nose Interpolation flap.  The donor area for the pedicle flap was then injected with anesthesia.  The flap was excised through the skin and subcutaneous tissue down to the layer of the underlying musculature.  The interpolation flap was carefully excised within this deep plane to maintain its blood supply.  The edges of the donor site were undermined.   The donor site was closed in a primary fashion.  The pedicle was then rotated into position and sutured.  Once the tube was sutured into place, adequate blood supply was confirmed with blanching and refill.  The pedicle was then wrapped with xeroform gauze and dressed appropriately with a telfa and gauze bandage to ensure continued blood supply and protect the attached pedicle.
Interpolation Flap Text: A decision was made to reconstruct the defect utilizing an interpolation axial flap and a staged reconstruction.  A telfa template was made of the defect.  This telfa template was then used to outline the interpolation flap.  The donor area for the pedicle flap was then injected with anesthesia.  The flap was excised through the skin and subcutaneous tissue down to the layer of the underlying musculature.  The interpolation flap was carefully excised within this deep plane to maintain its blood supply.  The edges of the donor site were undermined.   The donor site was closed in a primary fashion.  The pedicle was then rotated into position and sutured.  Once the tube was sutured into place, adequate blood supply was confirmed with blanching and refill.  The pedicle was then wrapped with xeroform gauze and dressed appropriately with a telfa and gauze bandage to ensure continued blood supply and protect the attached pedicle.
Melolabial Interpolation Flap Text: A decision was made to reconstruct the defect utilizing an interpolation axial flap and a staged reconstruction.  A telfa template was made of the defect.  This telfa template was then used to outline the melolabial interpolation flap.  The donor area for the pedicle flap was then injected with anesthesia.  The flap was excised through the skin and subcutaneous tissue down to the layer of the underlying musculature.  The pedicle flap was carefully excised within this deep plane to maintain its blood supply.  The edges of the donor site were undermined.   The donor site was closed in a primary fashion.  The pedicle was then rotated into position and sutured.  Once the tube was sutured into place, adequate blood supply was confirmed with blanching and refill.  The pedicle was then wrapped with xeroform gauze and dressed appropriately with a telfa and gauze bandage to ensure continued blood supply and protect the attached pedicle.
Mastoid Interpolation Flap Text: A decision was made to reconstruct the defect utilizing an interpolation axial flap and a staged reconstruction.  A telfa template was made of the defect.  This telfa template was then used to outline the mastoid interpolation flap.  The donor area for the pedicle flap was then injected with anesthesia.  The flap was excised through the skin and subcutaneous tissue down to the layer of the underlying musculature.  The pedicle flap was carefully excised within this deep plane to maintain its blood supply.  The edges of the donor site were undermined.   The donor site was closed in a primary fashion.  The pedicle was then rotated into position and sutured.  Once the tube was sutured into place, adequate blood supply was confirmed with blanching and refill.  The pedicle was then wrapped with xeroform gauze and dressed appropriately with a telfa and gauze bandage to ensure continued blood supply and protect the attached pedicle.
Posterior Auricular Interpolation Flap Text: A decision was made to reconstruct the defect utilizing an interpolation axial flap and a staged reconstruction.  A telfa template was made of the defect.  This telfa template was then used to outline the posterior auricular interpolation flap.  The donor area for the pedicle flap was then injected with anesthesia.  The flap was excised through the skin and subcutaneous tissue down to the layer of the underlying musculature.  The pedicle flap was carefully excised within this deep plane to maintain its blood supply.  The edges of the donor site were undermined.   The donor site was closed in a primary fashion.  The pedicle was then rotated into position and sutured.  Once the tube was sutured into place, adequate blood supply was confirmed with blanching and refill.  The pedicle was then wrapped with xeroform gauze and dressed appropriately with a telfa and gauze bandage to ensure continued blood supply and protect the attached pedicle.
Paramedian Forehead Flap Text: A decision was made to reconstruct the defect utilizing an interpolation axial flap and a staged reconstruction.  A telfa template was made of the defect.  This telfa template was then used to outline the paramedian forehead pedicle flap.  The donor area for the pedicle flap was then injected with anesthesia.  The flap was excised through the skin and subcutaneous tissue down to the layer of the underlying musculature.  The pedicle flap was carefully excised within this deep plane to maintain its blood supply.  The edges of the donor site were undermined.   The donor site was closed in a primary fashion.  The pedicle was then rotated into position and sutured.  Once the tube was sutured into place, adequate blood supply was confirmed with blanching and refill.  The pedicle was then wrapped with xeroform gauze and dressed appropriately with a telfa and gauze bandage to ensure continued blood supply and protect the attached pedicle.
Lip Wedge Excision Repair Text: Given the location of the defect and the proximity to free margins a full thickness wedge repair was deemed most appropriate.  Using a sterile surgical marker, the appropriate repair was drawn incorporating the defect and placing the expected incisions perpendicular to the vermilion border.  The vermilion border was also meticulously outlined to ensure appropriate reapproximation during the repair.  The area thus outlined was incised through and through with a #15 scalpel blade.  The muscularis and dermis were reaproximated with deep sutures following hemostasis. Care was taken to realign the vermilion border before proceeding with the superficial closure.  Once the vermilion was realigned the superfical and mucosal closure was finished.
Ftsg Text: The defect edges were debeveled with a #15 scalpel blade.  Given the location of the defect, shape of the defect and the proximity to free margins a full thickness skin graft was deemed most appropriate.  Using a sterile surgical marker, the primary defect shape was transferred to the donor site. The area thus outlined was incised deep to adipose tissue with a #15 scalpel blade.  The harvested graft was then trimmed of adipose tissue until only dermis and epidermis was left.  The skin margins of the secondary defect were undermined to an appropriate distance in all directions utilizing iris scissors.  The secondary defect was closed with interrupted buried subcutaneous sutures.  The skin edges were then re-apposed with running  sutures.  The skin graft was then placed in the primary defect and oriented appropriately.
Split-Thickness Skin Graft Text: The defect edges were debeveled with a #15 scalpel blade.  Given the location of the defect, shape of the defect and the proximity to free margins a split thickness skin graft was deemed most appropriate.  Using a sterile surgical marker, the primary defect shape was transferred to the donor site. The split thickness graft was then harvested.  The skin graft was then placed in the primary defect and oriented appropriately.
Burow's Graft Text: The defect edges were debeveled with a #15 scalpel blade.  Given the location of the defect, shape of the defect, the proximity to free margins and the presence of a standing cone deformity a Burow's skin graft was deemed most appropriate. The standing cone was removed and this tissue was then trimmed to the shape of the primary defect. The adipose tissue was also removed until only dermis and epidermis were left.  The skin margins of the secondary defect were undermined to an appropriate distance in all directions utilizing iris scissors.  The secondary defect was closed with interrupted buried subcutaneous sutures.  The skin edges were then re-apposed with running  sutures.  The skin graft was then placed in the primary defect and oriented appropriately.
Cartilage Graft Text: The defect edges were debeveled with a #15 scalpel blade.  Given the location of the defect, shape of the defect, the fact the defect involved a full thickness cartilage defect a cartilage graft was deemed most appropriate.  An appropriate donor site was identified, cleansed, and anesthetized. The cartilage graft was then harvested and transferred to the recipient site, oriented appropriately and then sutured into place.  The secondary defect was then repaired using a primary closure.
Composite Graft Text: The defect edges were debeveled with a #15 scalpel blade.  Given the location of the defect, shape of the defect, the proximity to free margins and the fact the defect was full thickness a composite graft was deemed most appropriate.  The defect was outline and then transferred to the donor site.  A full thickness graft was then excised from the donor site. The graft was then placed in the primary defect, oriented appropriately and then sutured into place.  The secondary defect was then repaired using a primary closure.
Epidermal Autograft Text: The defect edges were debeveled with a #15 scalpel blade.  Given the location of the defect, shape of the defect and the proximity to free margins an epidermal autograft was deemed most appropriate.  Using a sterile surgical marker, the primary defect shape was transferred to the donor site. The epidermal graft was then harvested.  The skin graft was then placed in the primary defect and oriented appropriately.
Dermal Autograft Text: The defect edges were debeveled with a #15 scalpel blade.  Given the location of the defect, shape of the defect and the proximity to free margins a dermal autograft was deemed most appropriate.  Using a sterile surgical marker, the primary defect shape was transferred to the donor site. The area thus outlined was incised deep to adipose tissue with a #15 scalpel blade.  The harvested graft was then trimmed of adipose and epidermal tissue until only dermis was left.  The skin graft was then placed in the primary defect and oriented appropriately.
Skin Substitute Text: The defect edges were debeveled with a #15 scalpel blade.  Given the location of the defect, shape of the defect and the proximity to free margins a skin substitute graft was deemed most appropriate.  The graft material was trimmed to fit the size of the defect. The graft was then placed in the primary defect and oriented appropriately.
Tissue Cultured Epidermal Autograft Text: The defect edges were debeveled with a #15 scalpel blade.  Given the location of the defect, shape of the defect and the proximity to free margins a tissue cultured epidermal autograft was deemed most appropriate.  The graft was then trimmed to fit the size of the defect.  The graft was then placed in the primary defect and oriented appropriately.
Xenograft Text: The defect edges were debeveled with a #15 scalpel blade.  Given the location of the defect, shape of the defect and the proximity to free margins a xenograft was deemed most appropriate.  The graft was then trimmed to fit the size of the defect.  The graft was then placed in the primary defect and oriented appropriately.
Purse String (Intermediate) Text: Given the location of the defect and the characteristics of the surrounding skin a purse string intermediate closure was deemed most appropriate.  Undermining was performed circumferentially around the surgical defect.  A purse string suture was then placed and tightened.
Purse String (Simple) Text: Given the location of the defect and the characteristics of the surrounding skin a purse string simple closure was deemed most appropriate.  Undermining was performed circumferentially around the surgical defect.  A purse string suture was then placed and tightened.
Complex Repair And Single Advancement Flap Text: The defect edges were debeveled with a #15 scalpel blade.  The primary defect was closed partially with a complex linear closure.  Given the location of the remaining defect, shape of the defect and the proximity to free margins a single advancement flap was deemed most appropriate for complete closure of the defect.  Using a sterile surgical marker, an appropriate advancement flap was drawn incorporating the defect and placing the expected incisions within the relaxed skin tension lines where possible.    The area thus outlined was incised deep to adipose tissue with a #15 scalpel blade.  The skin margins were undermined to an appropriate distance in all directions utilizing iris scissors.
Complex Repair And Double Advancement Flap Text: The defect edges were debeveled with a #15 scalpel blade.  The primary defect was closed partially with a complex linear closure.  Given the location of the remaining defect, shape of the defect and the proximity to free margins a double advancement flap was deemed most appropriate for complete closure of the defect.  Using a sterile surgical marker, an appropriate advancement flap was drawn incorporating the defect and placing the expected incisions within the relaxed skin tension lines where possible.    The area thus outlined was incised deep to adipose tissue with a #15 scalpel blade.  The skin margins were undermined to an appropriate distance in all directions utilizing iris scissors.
Complex Repair And Modified Advancement Flap Text: The defect edges were debeveled with a #15 scalpel blade.  The primary defect was closed partially with a complex linear closure.  Given the location of the remaining defect, shape of the defect and the proximity to free margins a modified advancement flap was deemed most appropriate for complete closure of the defect.  Using a sterile surgical marker, an appropriate advancement flap was drawn incorporating the defect and placing the expected incisions within the relaxed skin tension lines where possible.    The area thus outlined was incised deep to adipose tissue with a #15 scalpel blade.  The skin margins were undermined to an appropriate distance in all directions utilizing iris scissors.
Complex Repair And A-T Advancement Flap Text: The defect edges were debeveled with a #15 scalpel blade.  The primary defect was closed partially with a complex linear closure.  Given the location of the remaining defect, shape of the defect and the proximity to free margins an A-T advancement flap was deemed most appropriate for complete closure of the defect.  Using a sterile surgical marker, an appropriate advancement flap was drawn incorporating the defect and placing the expected incisions within the relaxed skin tension lines where possible.    The area thus outlined was incised deep to adipose tissue with a #15 scalpel blade.  The skin margins were undermined to an appropriate distance in all directions utilizing iris scissors.
Complex Repair And O-T Advancement Flap Text: The defect edges were debeveled with a #15 scalpel blade.  The primary defect was closed partially with a complex linear closure.  Given the location of the remaining defect, shape of the defect and the proximity to free margins an O-T advancement flap was deemed most appropriate for complete closure of the defect.  Using a sterile surgical marker, an appropriate advancement flap was drawn incorporating the defect and placing the expected incisions within the relaxed skin tension lines where possible.    The area thus outlined was incised deep to adipose tissue with a #15 scalpel blade.  The skin margins were undermined to an appropriate distance in all directions utilizing iris scissors.
Complex Repair And O-L Flap Text: The defect edges were debeveled with a #15 scalpel blade.  The primary defect was closed partially with a complex linear closure.  Given the location of the remaining defect, shape of the defect and the proximity to free margins an O-L flap was deemed most appropriate for complete closure of the defect.  Using a sterile surgical marker, an appropriate flap was drawn incorporating the defect and placing the expected incisions within the relaxed skin tension lines where possible.    The area thus outlined was incised deep to adipose tissue with a #15 scalpel blade.  The skin margins were undermined to an appropriate distance in all directions utilizing iris scissors.
Complex Repair And Bilobe Flap Text: The defect edges were debeveled with a #15 scalpel blade.  The primary defect was closed partially with a complex linear closure.  Given the location of the remaining defect, shape of the defect and the proximity to free margins a bilobe flap was deemed most appropriate for complete closure of the defect.  Using a sterile surgical marker, an appropriate advancement flap was drawn incorporating the defect and placing the expected incisions within the relaxed skin tension lines where possible.    The area thus outlined was incised deep to adipose tissue with a #15 scalpel blade.  The skin margins were undermined to an appropriate distance in all directions utilizing iris scissors.
Complex Repair And Melolabial Flap Text: The defect edges were debeveled with a #15 scalpel blade.  The primary defect was closed partially with a complex linear closure.  Given the location of the remaining defect, shape of the defect and the proximity to free margins a melolabial flap was deemed most appropriate for complete closure of the defect.  Using a sterile surgical marker, an appropriate advancement flap was drawn incorporating the defect and placing the expected incisions within the relaxed skin tension lines where possible.    The area thus outlined was incised deep to adipose tissue with a #15 scalpel blade.  The skin margins were undermined to an appropriate distance in all directions utilizing iris scissors.
Complex Repair And Rotation Flap Text: The defect edges were debeveled with a #15 scalpel blade.  The primary defect was closed partially with a complex linear closure.  Given the location of the remaining defect, shape of the defect and the proximity to free margins a rotation flap was deemed most appropriate for complete closure of the defect.  Using a sterile surgical marker, an appropriate advancement flap was drawn incorporating the defect and placing the expected incisions within the relaxed skin tension lines where possible.    The area thus outlined was incised deep to adipose tissue with a #15 scalpel blade.  The skin margins were undermined to an appropriate distance in all directions utilizing iris scissors.
Complex Repair And Rhombic Flap Text: The defect edges were debeveled with a #15 scalpel blade.  The primary defect was closed partially with a complex linear closure.  Given the location of the remaining defect, shape of the defect and the proximity to free margins a rhombic flap was deemed most appropriate for complete closure of the defect.  Using a sterile surgical marker, an appropriate advancement flap was drawn incorporating the defect and placing the expected incisions within the relaxed skin tension lines where possible.    The area thus outlined was incised deep to adipose tissue with a #15 scalpel blade.  The skin margins were undermined to an appropriate distance in all directions utilizing iris scissors.
Complex Repair And Transposition Flap Text: The defect edges were debeveled with a #15 scalpel blade.  The primary defect was closed partially with a complex linear closure.  Given the location of the remaining defect, shape of the defect and the proximity to free margins a transposition flap was deemed most appropriate for complete closure of the defect.  Using a sterile surgical marker, an appropriate advancement flap was drawn incorporating the defect and placing the expected incisions within the relaxed skin tension lines where possible.    The area thus outlined was incised deep to adipose tissue with a #15 scalpel blade.  The skin margins were undermined to an appropriate distance in all directions utilizing iris scissors.
Complex Repair And V-Y Plasty Text: The defect edges were debeveled with a #15 scalpel blade.  The primary defect was closed partially with a complex linear closure.  Given the location of the remaining defect, shape of the defect and the proximity to free margins a V-Y plasty was deemed most appropriate for complete closure of the defect.  Using a sterile surgical marker, an appropriate advancement flap was drawn incorporating the defect and placing the expected incisions within the relaxed skin tension lines where possible.    The area thus outlined was incised deep to adipose tissue with a #15 scalpel blade.  The skin margins were undermined to an appropriate distance in all directions utilizing iris scissors.
Complex Repair And M Plasty Text: The defect edges were debeveled with a #15 scalpel blade.  The primary defect was closed partially with a complex linear closure.  Given the location of the remaining defect, shape of the defect and the proximity to free margins an M plasty was deemed most appropriate for complete closure of the defect.  Using a sterile surgical marker, an appropriate advancement flap was drawn incorporating the defect and placing the expected incisions within the relaxed skin tension lines where possible.    The area thus outlined was incised deep to adipose tissue with a #15 scalpel blade.  The skin margins were undermined to an appropriate distance in all directions utilizing iris scissors.
Complex Repair And Double M Plasty Text: The defect edges were debeveled with a #15 scalpel blade.  The primary defect was closed partially with a complex linear closure.  Given the location of the remaining defect, shape of the defect and the proximity to free margins a double M plasty was deemed most appropriate for complete closure of the defect.  Using a sterile surgical marker, an appropriate advancement flap was drawn incorporating the defect and placing the expected incisions within the relaxed skin tension lines where possible.    The area thus outlined was incised deep to adipose tissue with a #15 scalpel blade.  The skin margins were undermined to an appropriate distance in all directions utilizing iris scissors.
Complex Repair And W Plasty Text: The defect edges were debeveled with a #15 scalpel blade.  The primary defect was closed partially with a complex linear closure.  Given the location of the remaining defect, shape of the defect and the proximity to free margins a W plasty was deemed most appropriate for complete closure of the defect.  Using a sterile surgical marker, an appropriate advancement flap was drawn incorporating the defect and placing the expected incisions within the relaxed skin tension lines where possible.    The area thus outlined was incised deep to adipose tissue with a #15 scalpel blade.  The skin margins were undermined to an appropriate distance in all directions utilizing iris scissors.
Complex Repair And Z Plasty Text: The defect edges were debeveled with a #15 scalpel blade.  The primary defect was closed partially with a complex linear closure.  Given the location of the remaining defect, shape of the defect and the proximity to free margins a Z plasty was deemed most appropriate for complete closure of the defect.  Using a sterile surgical marker, an appropriate advancement flap was drawn incorporating the defect and placing the expected incisions within the relaxed skin tension lines where possible.    The area thus outlined was incised deep to adipose tissue with a #15 scalpel blade.  The skin margins were undermined to an appropriate distance in all directions utilizing iris scissors.
Complex Repair And Dorsal Nasal Flap Text: The defect edges were debeveled with a #15 scalpel blade.  The primary defect was closed partially with a complex linear closure.  Given the location of the remaining defect, shape of the defect and the proximity to free margins a dorsal nasal flap was deemed most appropriate for complete closure of the defect.  Using a sterile surgical marker, an appropriate flap was drawn incorporating the defect and placing the expected incisions within the relaxed skin tension lines where possible.    The area thus outlined was incised deep to adipose tissue with a #15 scalpel blade.  The skin margins were undermined to an appropriate distance in all directions utilizing iris scissors.
Complex Repair And Ftsg Text: The defect edges were debeveled with a #15 scalpel blade.  The primary defect was closed partially with a complex linear closure.  Given the location of the defect, shape of the defect and the proximity to free margins a full thickness skin graft was deemed most appropriate to repair the remaining defect.  The graft was trimmed to fit the size of the remaining defect.  The graft was then placed in the primary defect, oriented appropriately, and sutured into place.
Complex Repair And Burow's Graft Text: The defect edges were debeveled with a #15 scalpel blade.  The primary defect was closed partially with a complex linear closure.  Given the location of the defect, shape of the defect, the proximity to free margins and the presence of a standing cone deformity a Burow's graft was deemed most appropriate to repair the remaining defect.  The graft was trimmed to fit the size of the remaining defect.  The graft was then placed in the primary defect, oriented appropriately, and sutured into place.
Complex Repair And Split-Thickness Skin Graft Text: The defect edges were debeveled with a #15 scalpel blade.  The primary defect was closed partially with a complex linear closure.  Given the location of the defect, shape of the defect and the proximity to free margins a split thickness skin graft was deemed most appropriate to repair the remaining defect.  The graft was trimmed to fit the size of the remaining defect.  The graft was then placed in the primary defect, oriented appropriately, and sutured into place.
Complex Repair And Epidermal Autograft Text: The defect edges were debeveled with a #15 scalpel blade.  The primary defect was closed partially with a complex linear closure.  Given the location of the defect, shape of the defect and the proximity to free margins an epidermal autograft was deemed most appropriate to repair the remaining defect.  The graft was trimmed to fit the size of the remaining defect.  The graft was then placed in the primary defect, oriented appropriately, and sutured into place.
Complex Repair And Dermal Autograft Text: The defect edges were debeveled with a #15 scalpel blade.  The primary defect was closed partially with a complex linear closure.  Given the location of the defect, shape of the defect and the proximity to free margins an dermal autograft was deemed most appropriate to repair the remaining defect.  The graft was trimmed to fit the size of the remaining defect.  The graft was then placed in the primary defect, oriented appropriately, and sutured into place.
Complex Repair And Tissue Cultured Epidermal Autograft Text: The defect edges were debeveled with a #15 scalpel blade.  The primary defect was closed partially with a complex linear closure.  Given the location of the defect, shape of the defect and the proximity to free margins an tissue cultured epidermal autograft was deemed most appropriate to repair the remaining defect.  The graft was trimmed to fit the size of the remaining defect.  The graft was then placed in the primary defect, oriented appropriately, and sutured into place.
Complex Repair And Xenograft Text: The defect edges were debeveled with a #15 scalpel blade.  The primary defect was closed partially with a complex linear closure.  Given the location of the defect, shape of the defect and the proximity to free margins a xenograft was deemed most appropriate to repair the remaining defect.  The graft was trimmed to fit the size of the remaining defect.  The graft was then placed in the primary defect, oriented appropriately, and sutured into place.
Complex Repair And Skin Substitute Graft Text: The defect edges were debeveled with a #15 scalpel blade.  The primary defect was closed partially with a complex linear closure.  Given the location of the remaining defect, shape of the defect and the proximity to free margins a skin substitute graft was deemed most appropriate to repair the remaining defect.  The graft was trimmed to fit the size of the remaining defect.  The graft was then placed in the primary defect, oriented appropriately, and sutured into place.
Path Notes (To The Dermatopathologist): Suture on N side of specimen
Consent was obtained from the patient. The risks and benefits to therapy were discussed in detail. Specifically, the risks of infection, scarring, bleeding, prolonged wound healing, incomplete removal, allergy to anesthesia, nerve injury and recurrence were addressed. Prior to the procedure, the treatment site was clearly identified and confirmed by the patient. All components of Universal Protocol/PAUSE Rule completed.
Render Post-Care Instructions In Note?: yes
Post-Care Instructions: I reviewed with the patient in detail post-care instructions. Patient is not to engage in any heavy lifting, exercise, or swimming for the next 14 days. Should the patient develop any fevers, chills, bleeding, severe pain patient will contact the office immediately.
Home Suture Removal Text: Patient was provided a home suture removal kit and will remove their sutures at home.  If they have any questions or difficulties they will call the office.
Where Do You Want The Question To Include Opioid Counseling Located?: Case Summary Tab
Billing Type: Third-Party Bill
Information: Selecting Yes will display possible errors in your note based on the variables you have selected. This validation is only offered as a suggestion for you. PLEASE NOTE THAT THE VALIDATION TEXT WILL BE REMOVED WHEN YOU FINALIZE YOUR NOTE. IF YOU WANT TO FAX A PRELIMINARY NOTE YOU WILL NEED TO TOGGLE THIS TO 'NO' IF YOU DO NOT WANT IT IN YOUR FAXED NOTE.

## 2022-03-31 ENCOUNTER — APPOINTMENT (RX ONLY)
Dept: URBAN - METROPOLITAN AREA CLINIC 120 | Facility: CLINIC | Age: 27
Setting detail: DERMATOLOGY
End: 2022-03-31

## 2022-03-31 DIAGNOSIS — Z48.02 ENCOUNTER FOR REMOVAL OF SUTURES: ICD-10-CM

## 2022-03-31 PROCEDURE — ? SUTURE REMOVAL (GLOBAL PERIOD)

## 2022-03-31 PROCEDURE — 99024 POSTOP FOLLOW-UP VISIT: CPT

## 2022-03-31 ASSESSMENT — LOCATION SIMPLE DESCRIPTION DERM: LOCATION SIMPLE: LEFT THIGH

## 2022-03-31 ASSESSMENT — LOCATION DETAILED DESCRIPTION DERM: LOCATION DETAILED: LEFT ANTERIOR PROXIMAL THIGH

## 2022-03-31 ASSESSMENT — LOCATION ZONE DERM: LOCATION ZONE: LEG

## 2022-03-31 NOTE — PROCEDURE: SUTURE REMOVAL (GLOBAL PERIOD)
Detail Level: Detailed
Add 90637 Cpt? (Important Note: In 2017 The Use Of 50696 Is Being Tracked By Cms To Determine Future Global Period Reimbursement For Global Periods): yes

## 2022-04-01 ENCOUNTER — NON-PROVIDER VISIT (OUTPATIENT)
Dept: MEDICAL GROUP | Facility: MEDICAL CENTER | Age: 27
End: 2022-04-01
Payer: COMMERCIAL

## 2022-04-01 DIAGNOSIS — Z23 NEED FOR HPV VACCINATION: ICD-10-CM

## 2022-04-01 PROCEDURE — 99999 PR NO CHARGE: CPT | Performed by: PHYSICIAN ASSISTANT

## 2022-04-01 PROCEDURE — 90471 IMMUNIZATION ADMIN: CPT | Performed by: PHYSICIAN ASSISTANT

## 2022-04-01 PROCEDURE — 90651 9VHPV VACCINE 2/3 DOSE IM: CPT | Performed by: PHYSICIAN ASSISTANT

## 2022-04-01 NOTE — PROGRESS NOTES
"Melissa Mckeon is a 26 y.o. female here for a non-provider visit for:   HPV 2 of 3    Reason for immunization: continue or complete series started at the office  Immunization records indicate need for vaccine: Yes, confirmed with Epic and confirmed with [place] [facility]  Minimum interval has been met for this vaccine: Yes  ABN completed: Not Indicated    VIS Dated  8/6/21 was given to patient: Yes  All IAC Questionnaire questions were answered \"No.\"    Patient tolerated injection and no adverse effects were observed or reported: Yes    Pt scheduled for next dose in series: No    "

## 2022-10-27 ENCOUNTER — NON-PROVIDER VISIT (OUTPATIENT)
Dept: MEDICAL GROUP | Facility: MEDICAL CENTER | Age: 27
End: 2022-10-27
Payer: COMMERCIAL

## 2022-10-27 DIAGNOSIS — Z23 NEED FOR VACCINATION: ICD-10-CM

## 2022-10-27 PROCEDURE — 90651 9VHPV VACCINE 2/3 DOSE IM: CPT | Performed by: FAMILY MEDICINE

## 2022-10-27 PROCEDURE — 99999 PR NO CHARGE: CPT | Performed by: FAMILY MEDICINE

## 2022-10-27 PROCEDURE — 90471 IMMUNIZATION ADMIN: CPT | Performed by: FAMILY MEDICINE

## 2022-10-27 NOTE — PROGRESS NOTES
"Melissa Mckeon is a 27 y.o. female here for a non-provider visit for:   HPV 3 of 3    Reason for immunization: continue or complete series started at the office  Immunization records indicate need for vaccine: Yes, confirmed with Epic and confirmed with [place] [facility]  Minimum interval has been met for this vaccine: Yes  ABN completed: Not Indicated    VIS Dated  Yes  was given to patient: Yes  All IAC Questionnaire questions were answered \"No.\"    Patient tolerated injection and no adverse effects were observed or reported: Yes    Pt scheduled for next dose in series: Not Indicated  "

## 2022-11-29 RX ORDER — DROSPIRENONE AND ETHINYL ESTRADIOL TABLETS 0.02-3(28)
KIT ORAL
Qty: 84 TABLET | Refills: 4 | Status: SHIPPED | OUTPATIENT
Start: 2022-11-29 | End: 2024-02-20

## 2023-03-07 ENCOUNTER — HOSPITAL ENCOUNTER (OUTPATIENT)
Facility: MEDICAL CENTER | Age: 28
End: 2023-03-07
Attending: OBSTETRICS & GYNECOLOGY
Payer: COMMERCIAL

## 2023-03-07 ENCOUNTER — GYNECOLOGY VISIT (OUTPATIENT)
Dept: OBGYN | Facility: CLINIC | Age: 28
End: 2023-03-07
Payer: COMMERCIAL

## 2023-03-07 DIAGNOSIS — Z11.3 SCREENING EXAMINATION FOR SEXUALLY TRANSMITTED DISEASE: ICD-10-CM

## 2023-03-07 DIAGNOSIS — Z78.9 USES BIRTH CONTROL: ICD-10-CM

## 2023-03-07 DIAGNOSIS — Z01.419 WOMEN'S ANNUAL ROUTINE GYNECOLOGICAL EXAMINATION: ICD-10-CM

## 2023-03-07 LAB
CANDIDA DNA VAG QL PROBE+SIG AMP: NEGATIVE
G VAGINALIS DNA VAG QL PROBE+SIG AMP: NEGATIVE
T VAGINALIS DNA VAG QL PROBE+SIG AMP: NEGATIVE

## 2023-03-07 PROCEDURE — 99395 PREV VISIT EST AGE 18-39: CPT | Performed by: OBSTETRICS & GYNECOLOGY

## 2023-03-07 PROCEDURE — 87510 GARDNER VAG DNA DIR PROBE: CPT

## 2023-03-07 PROCEDURE — 87591 N.GONORRHOEAE DNA AMP PROB: CPT

## 2023-03-07 PROCEDURE — 87480 CANDIDA DNA DIR PROBE: CPT

## 2023-03-07 PROCEDURE — 87660 TRICHOMONAS VAGIN DIR PROBE: CPT

## 2023-03-07 PROCEDURE — 87491 CHLMYD TRACH DNA AMP PROBE: CPT

## 2023-03-07 NOTE — PROGRESS NOTES
ANNUAL GYNECOLOGY VISIT    Chief Complaint  No chief complaint on file.      Subjective  Melissa Mckeon is a 27 y.o. female who presents today for Annual Exam.  She is doing well with no complaints. She desires STI screening today. She is on an OCP and pleased with this method. She is not contemplating pregnancy within the next year.     Preventive Care   Immunization History   Administered Date(s) Administered    9VHPV VACCINE 2-3 DOSE IM (GARDASIL 9) 01/31/2022, 04/01/2022, 10/27/2022    DTP - Historical vaccine 1993, 1993, 01/18/1994, 09/02/1994    Dtap Vaccine 03/12/1998    Hepatitis A Vaccine, Ped/Adol 08/27/2005, 03/27/2006    Hepatitis B Vaccine Adolescent/Pediatric 1993, 1993, 02/22/1994, 10/04/2000, 11/20/2000    Hib Vaccine (Prp-d) - HISTORICAL DATA 1993, 1993, 01/18/1994, 09/02/1994    Influenza Vac Subunit Quad Inj (Pf) 09/30/2022    Influenza Vaccine Quad Inj (Pf) 10/13/2019, 09/15/2021    Influenza Vaccine Quad Recombinant 10/18/2020    MENING VAC SERO B 2 DOSE SCHED IM (BEXSERO) 08/28/2007    MENINGOCOCCAL ACWY - UNSPECIFIED 08/28/2007    MMR Vaccine 09/02/1994, 03/12/1998    Meningococcal Conjugate Vaccine MCV4 (MENVEO) 06/20/2011    OPV TRIVALENT - HISTORICAL DATA (GIVEN PRIOR TO MAY 2016) 1993, 1993, 01/18/1994, 02/22/1994, 03/12/1998    PFIZER BIVALENT BOOSTER SARS-COV-2 VACCINE (12+) 09/30/2022    PFIZER PURPLE CAP SARS-COV-2 VACCINATION (12+) 04/14/2021, 05/06/2021, 12/01/2021    TD Vaccine 08/27/2005    Tdap Vaccine 06/20/2011, 07/28/2021    Varicella Vaccine Live 08/27/2005, 04/10/2008, 06/20/2011     Last Mammogram: N/A    Gynecology History and ROS  Current Sexual Activity: Yes  History of sexually transmitted diseases?  no  Abnormal discharge? No  Current Contraception:  OCP    Menstrual History  Patient's last menstrual period was 02/28/2023 (exact date).  Periods are regular  q 28 days, lasting 4 days.   Clots or heavy flow:  No  Dysmenorrhea: No  Intermenstrual bleeding/spotting: No  Significant pain with periods:No  Bothersome PMS symptoms: No  Significant Pelvic Pain: No    Pap History  Last pap smear: 9/30/2021  History of moderate or severe dysplasia: No    Cancer Risk Assessement:  Family history of:   - Breast cancer: in aunt, she was above the age of 60 at diagnosis    - Ovarian cancer: no   - Uterine cancer: no   - Colon cancer: no    Obstetric History  OB History   No obstetric history on file.       Past Medical History  Past Medical History:   Diagnosis Date    Gastritis     Generalized anxiety disorder 9/27/2021       Past Surgical History  Past Surgical History:   Procedure Laterality Date    DENTAL EXTRACTION(S)      widom teeth extraction       Social History  Social History     Socioeconomic History    Marital status: Single     Spouse name: Not on file    Number of children: Not on file    Years of education: Not on file    Highest education level: Bachelor's degree (e.g., BA, AB, BS)   Occupational History    Not on file   Tobacco Use    Smoking status: Never    Smokeless tobacco: Never   Vaping Use    Vaping Use: Some days    Substances: THC   Substance and Sexual Activity    Alcohol use: Never    Drug use: Yes     Types: Inhaled, Marijuana     Comment: thc    Sexual activity: Yes     Partners: Male     Birth control/protection: Pill   Other Topics Concern    Not on file   Social History Narrative    Not on file     Social Determinants of Health     Financial Resource Strain: Not on file   Food Insecurity: Not on file   Transportation Needs: Not on file   Physical Activity: Not on file   Stress: Not on file   Social Connections: Not on file   Intimate Partner Violence: Not on file   Housing Stability: Not on file       Family History  Family History   Problem Relation Age of Onset    Migraines Mother     Thyroid Mother         hypothyroid    Hypertension Father     Diabetes Paternal Aunt     Cancer Maternal  Grandmother         breast cancer in her 60s       Home Medications  Current Outpatient Medications on File Prior to Visit   Medication Sig Dispense Refill    LORYNA 3-0.02 MG per tablet TAKE 1 TABLET BY MOUTH EVERY DAY 84 Tablet 4    fluticasone (FLONASE) 50 MCG/ACT nasal spray ADMINISTER 1 SPRAY INTO AFFECTED NOSTRIL(S) EVERY DAY. 16 mL 1    omeprazole (PRILOSEC) 20 MG delayed-release capsule Take 1 capsule by mouth 1 time a day as needed. 90 capsule 1     No current facility-administered medications on file prior to visit.       Allergies/Reactions  No Known Allergies    ROS  Positive ROS: no  Gen: no fevers or chills, no significant weight loss or gain, excessive fatigue  Respiratory:  no cough or dyspnea  Cardiac:  no chest pain, no palpitations, no syncope  Breast: no breast discharge, pain, lump or skin changes  GI:  no heartburn, no abdominal pain, no nausea or vomiting  Urinary: no dysuria, urgency, frequency, incontinence   Psych: no depression or anxiety  Neuro: no migraines with aura, fainting spells, numbness or tingling  Extremities: no joint pain, persistently swollen ankles, recurrent leg cramps      Physical Examination:  Vital Signs:   Vitals:    03/07/23 0912   BP: (P) 117/76   BP Location: (P) Right arm   Patient Position: (P) Sitting   Weight: (P) 122 lb     Body mass index is 20.94 kg/m² (pended).    Constitutional: The patient is well developed and well nourished.  Psychiatric: Patient is oriented to time place and person.   Skin: No rash observed.  Neck: Appears symmetric. Thyroid normal size  Respiratory: normal effort  Breast: Inspection reveals no asymetry or nipple discharge, no skin thickening, dimpling or erythema.  Palpation demonstrates no masses. She has nipple piercing in place  Abdomen: Soft, non-tender.  Pelvic Exam:     Vulva: external female genitalia are normal in appearance. No lesions    Urethra - no lesions, no erythema    Vagina: moist, pink, normal ruggae    Cervix: pink,  smooth, no lesions, no CMT    Uterus - non-tender, normal size, shape, contour, mobile, anteverted    Ovaries: non-tender, no appreciable masses  Pap Smear performed: No  Extremeties: Legs are symmetric and without tenderness. There is no edema present.    Labs/Imaging:  No results found for: HDL, LDL  No components found for: A1C1  WBC (K/uL)   Date Value   02/14/2022 6.4     Lab Results   Component Value Date/Time    CO2 22 11/18/2020 1243    BUN 8 11/18/2020 1243       Assessment & Plan  Melissa Mckeon is a 27 y.o. female who presents today for Annual Gyn Exam.     1. Women's annual routine gynecological examination  - She is up to date on pap smear  - She requested STI testing today  - She is not due for mammogram, colonoscopy, or DEXA due to young age    2. Screening examination for sexually transmitted disease  - Cultures performed today    3. Uses birth control  - On OCP's, pleased with current method  - She reports that she is not planning a pregnancy in the next year    Return: Annually or PRN    Mai Buitrago M.D.

## 2023-03-07 NOTE — PROGRESS NOTES
Patient here for annual exam  Last pap done/result: 09/29/2021, negative  LMP: 02/28/2023  BCM: OCP's  Last mammogram, if applicable: n/a  Phone number: [phone] (home)   Pharmacy verified

## 2023-03-08 LAB
C TRACH DNA GENITAL QL NAA+PROBE: NEGATIVE
N GONORRHOEA DNA GENITAL QL NAA+PROBE: NEGATIVE
SPECIMEN SOURCE: NORMAL

## 2024-02-20 RX ORDER — DROSPIRENONE AND ETHINYL ESTRADIOL TABLETS 0.02-3(28)
KIT ORAL
Qty: 84 TABLET | Refills: 4 | Status: SHIPPED | OUTPATIENT
Start: 2024-02-20

## 2025-01-15 ENCOUNTER — GYNECOLOGY VISIT (OUTPATIENT)
Dept: OBGYN | Facility: CLINIC | Age: 30
End: 2025-01-15
Payer: COMMERCIAL

## 2025-01-15 ENCOUNTER — HOSPITAL ENCOUNTER (OUTPATIENT)
Facility: MEDICAL CENTER | Age: 30
End: 2025-01-15
Attending: STUDENT IN AN ORGANIZED HEALTH CARE EDUCATION/TRAINING PROGRAM
Payer: COMMERCIAL

## 2025-01-15 VITALS
BODY MASS INDEX: 21.31 KG/M2 | SYSTOLIC BLOOD PRESSURE: 112 MMHG | DIASTOLIC BLOOD PRESSURE: 79 MMHG | WEIGHT: 124.8 LBS | HEART RATE: 97 BPM | HEIGHT: 64 IN

## 2025-01-15 DIAGNOSIS — Z30.41 ENCOUNTER FOR SURVEILLANCE OF CONTRACEPTIVE PILLS: ICD-10-CM

## 2025-01-15 DIAGNOSIS — N63.11 MASS OF UPPER OUTER QUADRANT OF RIGHT BREAST: ICD-10-CM

## 2025-01-15 DIAGNOSIS — N63.21 MASS OF UPPER OUTER QUADRANT OF LEFT BREAST: ICD-10-CM

## 2025-01-15 DIAGNOSIS — Z01.419 WOMEN'S ANNUAL ROUTINE GYNECOLOGICAL EXAMINATION: ICD-10-CM

## 2025-01-15 PROCEDURE — 99395 PREV VISIT EST AGE 18-39: CPT | Performed by: STUDENT IN AN ORGANIZED HEALTH CARE EDUCATION/TRAINING PROGRAM

## 2025-01-15 PROCEDURE — 3074F SYST BP LT 130 MM HG: CPT | Performed by: STUDENT IN AN ORGANIZED HEALTH CARE EDUCATION/TRAINING PROGRAM

## 2025-01-15 PROCEDURE — 3078F DIAST BP <80 MM HG: CPT | Performed by: STUDENT IN AN ORGANIZED HEALTH CARE EDUCATION/TRAINING PROGRAM

## 2025-01-15 PROCEDURE — 87491 CHLMYD TRACH DNA AMP PROBE: CPT

## 2025-01-15 PROCEDURE — 87591 N.GONORRHOEAE DNA AMP PROB: CPT

## 2025-01-15 PROCEDURE — 99459 PELVIC EXAMINATION: CPT | Performed by: STUDENT IN AN ORGANIZED HEALTH CARE EDUCATION/TRAINING PROGRAM

## 2025-01-15 PROCEDURE — 88142 CYTOPATH C/V THIN LAYER: CPT

## 2025-01-15 PROCEDURE — 87624 HPV HI-RISK TYP POOLED RSLT: CPT

## 2025-01-15 NOTE — PROGRESS NOTES
Pt presents for annual exam   Last seen on: 3/7/23 Iftikhar  Phone: [phone]   Pharmacy verified   LMP: 12/29/2024 - states that periods are normal and monthly   BCM: pills (Loryna)   Sexually active: yes   Last pap: 9/30/21 NILM  Pt states back in October she noticed a lump in her left breast, she states that it is kind of hard but she is able to move it.

## 2025-01-15 NOTE — PROGRESS NOTES
ANNUAL GYNECOLOGY VISIT    Chief Complaint  Annual Exam    Subjective  Melissa Mckeon is a 29 y.o. female  Patient's last menstrual period was 2024 (exact date). on OCPs for contraception who presents today for Annual Exam.     Pt reports that in  she noticed a firm, mobile, non-tender lump in her left breast that has persisted. No changes noted since September.  Denies nipple discharge, dimpling, redness/itching.     Preventive Care   Immunization History   Administered Date(s) Administered    9VHPV VACCINE 2-3 DOSE IM (GARDASIL 9) 2022, 2022, 10/27/2022    COVID-19, mRNA, LNP-S, PF, ruthann-sucrose, 30 mcg/0.3 mL 2023, 2024    DTP - Historical vaccine 1993, 1993, 1994, 1994    Dtap Vaccine 1998    Hepatitis A Vaccine, Ped/Adol 2005, 2006    Hepatitis B Vaccine Adolescent/Pediatric 1993, 1993, 1994, 10/04/2000, 2000    Hib Vaccine (Prp-d) - HISTORICAL DATA 1993, 1993, 1994, 1994    Influenza Vac Subunit Quad Inj (Pf) 2022    Influenza Vaccine Quad Inj (Pf) 10/13/2019, 09/15/2021    Influenza Vaccine Quad Recombinant 10/18/2020    MENING VAC SERO B 2 DOSE SCHED IM (BEXSERO) 2007    MENINGOCOCCAL ACWY - UNSPECIFIED 2007    MMR Vaccine 1994, 1998    Meningococcal Conjugate Vaccine MCV4 (MENVEO) 2011    OPV TRIVALENT - HISTORICAL DATA (GIVEN PRIOR TO MAY 2016) 1993, 1993, 1994, 1994, 1998    PFIZER BIVALENT SARS-COV-2 VACCINE (12+) 2022    PFIZER PURPLE CAP SARS-COV-2 VACCINATION (12+) 2021, 2021, 2021    TD Vaccine 2005    Tdap Vaccine 2011, 2021    Varicella Vaccine Live 2005, 04/10/2008, 2011     Last Mammogram: Patient has not yet had a MMG but plan to order one today due to new onset lump in breasts.   Last Colonoscopy: Thinks she had one in 2020 at  [facility] due to chronic nausea/vomiting/diarrhea/constipation. Per patient, states it was normal.   Last DEXA: Not indicated  H/o osteoporosis or osteopenia: no  HPV vaccine: had  Last PAP done 21, NILM.   History of abnormal pap: no  Intimate partner violence (patient interviewed when partner was not in the room): Negative    Gynecology History  Current Sexual Activity: yes - 1   Partners: Male  History of sexually transmitted diseases? no  Abnormal vaginal discharge? no  Significant pelvic pain: No  Urinary incontinence: no    Menstrual History  Patient's last menstrual period was 2024 (exact date).  Menarche: 13 yo  Since being on OCPs, periods are regular every 28-30 days, lasting 3-4 days.   Menses are: Moderate with 1 -2 heavy days in which she changes pad/reusable period panties 2-3 times a day.   Dysmenorrhea :mild, occurring premenstrually and during first two days of menses. Cyclic symptoms include breast tenderness. No intermenstrual bleeding, spotting, or discharge.  Prior to being on OCPs, she states that she had heavy, irregular periods.     Contraception  Current: COCs, has been on it   Past: Nexplanon - constant bleeding, hormone IUD  - heavier, more painful menses.       Cancer Risk Assessement:  Family history of:   - Breast cancer: no   - Ovarian cancer: no   - Uterine cancer: no   - Colon cancer: no    Obstetric History  OB History    Para Term  AB Living   0 0 0 0 0 0   SAB IAB Ectopic Molar Multiple Live Births   0 0 0 0 0 0       Past Medical History  Past Medical History:   Diagnosis Date    Gastritis     Generalized anxiety disorder 2021       Past Surgical History  Past Surgical History:   Procedure Laterality Date    DENTAL EXTRACTION(S)      widom teeth extraction       Social History  Social History     Tobacco Use    Smoking status: Never    Smokeless tobacco: Never   Vaping Use    Vaping status: Former    Quit date: 10/31/2024    Substances: THC  "  Substance Use Topics    Alcohol use: Never    Drug use: Yes     Types: Inhaled, Marijuana     Comment: occ        Family History  Family History   Problem Relation Age of Onset    Migraines Mother     Thyroid Mother         hypothyroid    Hypertension Father     Cancer Maternal Aunt         breast cancer when she was 60    Diabetes Paternal Aunt     Dementia Maternal Grandmother     No Known Problems Maternal Grandfather     Stroke Paternal Grandmother         blood clots    No Known Problems Paternal Grandfather        Home Medications  Current Outpatient Medications   Medication Sig    LORYNA 3-0.02 MG per tablet TAKE 1 TABLET BY MOUTH EVERY DAY    fluticasone (FLONASE) 50 MCG/ACT nasal spray ADMINISTER 1 SPRAY INTO AFFECTED NOSTRIL(S) EVERY DAY.    omeprazole (PRILOSEC) 20 MG delayed-release capsule Take 1 capsule by mouth 1 time a day as needed.       Allergies/Reactions  No Known Allergies    ROS  Review of Systems:  Gen: no fevers or chills, no significant weight loss or gain  Respiratory:  no cough or dyspnea  Cardiac:  no chest pain, no palpitations, no syncope  Breast: no breast discharge, pain, lump or skin changes  GI:  no heartburn, no abdominal pain, no nausea or vomiting  Psych: no depression or anxiety  Neuro: no migraines with aura, fainting spells, numbness or tingling    Objective  /79 (BP Location: Left arm, Patient Position: Sitting, BP Cuff Size: Adult)   Pulse 97   Ht 5' 4\"   Wt 124 lb 12.8 oz   LMP 12/29/2024 (Exact Date)   BMI 21.42 kg/m²     Constitutional: The patient is well developed and well nourished.  Psychiatric: Patient is oriented to time place and person.   Skin: No rash observed.  Neck: Appears symmetric. Thyroid normal size  Respiratory: normal effort  Breast: Inspection reveals no asymetry or nipple discharge, no skin thickening, dimpling or erythema of breasts bilaterally.    - Left breast: Firm, mobile, pea-sized nodule palpated at 2 oclock position of breast. " "Non-tender to palpation.    - Right breast: Firm, mobile, pea-sized nodule palpated at 9 oclock position of breast. Non-tender to palpation.   Abdomen: Soft, non-tender.  Pelvic Exam:      Vulva: external female genitalia are normal in appearance. No lesions     Urethra - no lesions, no erythema     Vagina: moist, pink, normal ruggae     Cervix: pink, smooth, no lesions, no CMT     Uterus - non-tender, normal size, shape, contour, mobile     Ovaries: non-tender, no appreciable masses   Pap Smear performed: Yes   Chaperone Present: Tony Doshi MA  Extremities: Legs are symmetric and without tenderness. There is no edema present.    Labs/Imaging:  No results found for: \"HDL\", \"LDL\"  No components found for: \"A1C1\"  WBC (K/uL)   Date Value   02/14/2022 6.4     Lab Results   Component Value Date/Time    CO2 22 11/18/2020 1243    BUN 8 11/18/2020 1243       Patient Active Problem List    Diagnosis Date Noted    Chronic rhinitis 01/31/2022    Generalized anxiety disorder 09/27/2021    Health care maintenance 07/28/2021    Anxiety and depression 01/13/2021    GERD (gastroesophageal reflux disease) 11/02/2020       Assessment & Plan  Melissa Mckeon is a 29 y.o. female who presents today for Annual Gyn Exam.     1. Women's annual routine gynecological examination  - Cervical cancer screening: Pap: Collected today Last PAP done 9/30/21, NILM. No history of abnormal.   - STI Screen (HIV, Syphilis, Chlamydia / Gonorrhea): accepted-chlamydia and gonorrhea  - Mammogram and US ordered today due to bilateral breast nodules.   - Colonoscopy: Per patient report, she had colonoscopy done at [facility] in 2022 for chronic GI issues. Plan to obtain records.   - DEXA: Not indicated   - Immunizations: Recommended Flu and vaccine each season and COVID boosters when indicated.  - Tobacco: Encouraged continued abstinence.  - Diabetes Screen: Not indicated  - Cholesterol Screen: Not indicated  - Counseling: breast self exam, " mammography screening, and use and side effects of OCPs  Ordered:  - THINPREP PAP W/HPV AND CTNG; Future    2. Mass of upper outer quadrant of left breast   - Left breast: Firm, mobile, pea-sized nodule palpated at 2 oclock position of breast. Non-tender to palpation. Patient first noticed this in September/October 2024, no changes in size since then. Denies nipple discharge, dimpling, redness/itching.   - Discussed cysts/fibrocystic dense breasts. If fibrocystic dense breasts, recommend wearing a supportive bra and decreasing caffeine intake.   Ordered  - MA-DIAGNOSTIC MAMMO BILAT W/TOMOSYNTHESIS W/CAD; Future  - US-SCREENING WHOLE BREAST BILATERAL (3D SCREENING); Future    3. Mass of upper outer quadrant of right breast   - Right breast: Firm, mobile, pea-sized nodule palpated at 9 oclock position of breast. Non-tender to palpation. Denies nipple discharge, dimpling, redness/itching.   - Discussed cysts/fibrocystic dense breasts.   Ordered  - MA-DIAGNOSTIC MAMMO BILAT W/TOMOSYNTHESIS W/CAD; Future  - US-SCREENING WHOLE BREAST BILATERAL (3D SCREENING); Future    4. Encounter for surveillance of contraceptive pills  -  The various methods of appropriate contraception available were discussed in detail with the patient including the hormonal vs non-hormonal IUDs, Nexplanon, oral contraceptive pills, the OrthoEvra Patch, the NuvaRing, and depo-provera, including use and possible side effects effects of each option.   - Side effects of any hormonal contraception may include: N/V, HA, menstrual changes, weight fluctuation, breast tenderness, abdominal pain, anxiety or depression, DVT. Counseled that contraception does not protect against STIs and condom use was recommended  - Patient with no history of uncontrolled HTN or DM, denies history of blood clots/blood disorders, denies history of migraines with aura, denies tobacco use.  - Reviewed medical history and the patient has no contraindications.   - After an  individualized discussion of the risks, benefits, and alternatives of all methods and risks on not using contraception, the patient desires to continue using COCs (Loryna).   - Reviewed importance of compliance, missed pills, common and severe side effects.   - All questions answered. The patient will call with questions or concerns.    - Patient declines a need for AMARJIT refill at this time.       Return: Annually or PRN    Callie Lovell P.A.-C.  [facility]   1/15/2025

## 2025-01-22 LAB
HPV I/H RISK 1 DNA SPEC QL NAA+PROBE: NOT DETECTED
SPECIMEN SOURCE: NORMAL
THINPREP PAP, CYTOLOGY NL11781: NORMAL

## 2025-01-26 SDOH — ECONOMIC STABILITY: FOOD INSECURITY: WITHIN THE PAST 12 MONTHS, YOU WORRIED THAT YOUR FOOD WOULD RUN OUT BEFORE YOU GOT MONEY TO BUY MORE.: NEVER TRUE

## 2025-01-26 SDOH — ECONOMIC STABILITY: FOOD INSECURITY: WITHIN THE PAST 12 MONTHS, THE FOOD YOU BOUGHT JUST DIDN'T LAST AND YOU DIDN'T HAVE MONEY TO GET MORE.: NEVER TRUE

## 2025-01-26 SDOH — ECONOMIC STABILITY: INCOME INSECURITY: IN THE LAST 12 MONTHS, WAS THERE A TIME WHEN YOU WERE NOT ABLE TO PAY THE MORTGAGE OR RENT ON TIME?: NO

## 2025-01-26 SDOH — HEALTH STABILITY: PHYSICAL HEALTH: ON AVERAGE, HOW MANY DAYS PER WEEK DO YOU ENGAGE IN MODERATE TO STRENUOUS EXERCISE (LIKE A BRISK WALK)?: 3 DAYS

## 2025-01-26 SDOH — HEALTH STABILITY: PHYSICAL HEALTH: ON AVERAGE, HOW MANY MINUTES DO YOU ENGAGE IN EXERCISE AT THIS LEVEL?: 20 MIN

## 2025-01-26 SDOH — ECONOMIC STABILITY: INCOME INSECURITY: HOW HARD IS IT FOR YOU TO PAY FOR THE VERY BASICS LIKE FOOD, HOUSING, MEDICAL CARE, AND HEATING?: NOT HARD AT ALL

## 2025-01-26 ASSESSMENT — LIFESTYLE VARIABLES
HOW OFTEN DO YOU HAVE A DRINK CONTAINING ALCOHOL: NEVER
SKIP TO QUESTIONS 9-10: 1
HOW OFTEN DO YOU HAVE SIX OR MORE DRINKS ON ONE OCCASION: NEVER
HOW MANY STANDARD DRINKS CONTAINING ALCOHOL DO YOU HAVE ON A TYPICAL DAY: PATIENT DOES NOT DRINK
AUDIT-C TOTAL SCORE: 0

## 2025-01-26 ASSESSMENT — SOCIAL DETERMINANTS OF HEALTH (SDOH)
IN A TYPICAL WEEK, HOW MANY TIMES DO YOU TALK ON THE PHONE WITH FAMILY, FRIENDS, OR NEIGHBORS?: ONCE A WEEK
IN THE PAST 12 MONTHS, HAS THE ELECTRIC, GAS, OIL, OR WATER COMPANY THREATENED TO SHUT OFF SERVICE IN YOUR HOME?: NO
DO YOU BELONG TO ANY CLUBS OR ORGANIZATIONS SUCH AS CHURCH GROUPS UNIONS, FRATERNAL OR ATHLETIC GROUPS, OR SCHOOL GROUPS?: NO
IN A TYPICAL WEEK, HOW MANY TIMES DO YOU TALK ON THE PHONE WITH FAMILY, FRIENDS, OR NEIGHBORS?: ONCE A WEEK
HOW OFTEN DO YOU GET TOGETHER WITH FRIENDS OR RELATIVES?: ONCE A WEEK
HOW OFTEN DO YOU ATTENT MEETINGS OF THE CLUB OR ORGANIZATION YOU BELONG TO?: NEVER
HOW OFTEN DO YOU ATTENT MEETINGS OF THE CLUB OR ORGANIZATION YOU BELONG TO?: NEVER
WITHIN THE PAST 12 MONTHS, YOU WORRIED THAT YOUR FOOD WOULD RUN OUT BEFORE YOU GOT THE MONEY TO BUY MORE: NEVER TRUE
DO YOU BELONG TO ANY CLUBS OR ORGANIZATIONS SUCH AS CHURCH GROUPS UNIONS, FRATERNAL OR ATHLETIC GROUPS, OR SCHOOL GROUPS?: NO
HOW OFTEN DO YOU HAVE SIX OR MORE DRINKS ON ONE OCCASION: NEVER
ARE YOU MARRIED, WIDOWED, DIVORCED, SEPARATED, NEVER MARRIED, OR LIVING WITH A PARTNER?: LIVING WITH PARTNER
HOW OFTEN DO YOU GET TOGETHER WITH FRIENDS OR RELATIVES?: ONCE A WEEK
HOW OFTEN DO YOU HAVE A DRINK CONTAINING ALCOHOL: NEVER
HOW HARD IS IT FOR YOU TO PAY FOR THE VERY BASICS LIKE FOOD, HOUSING, MEDICAL CARE, AND HEATING?: NOT HARD AT ALL
HOW MANY DRINKS CONTAINING ALCOHOL DO YOU HAVE ON A TYPICAL DAY WHEN YOU ARE DRINKING: PATIENT DOES NOT DRINK
HOW OFTEN DO YOU ATTEND CHURCH OR RELIGIOUS SERVICES?: NEVER
ARE YOU MARRIED, WIDOWED, DIVORCED, SEPARATED, NEVER MARRIED, OR LIVING WITH A PARTNER?: LIVING WITH PARTNER
HOW OFTEN DO YOU ATTEND CHURCH OR RELIGIOUS SERVICES?: NEVER

## 2025-01-29 ENCOUNTER — OFFICE VISIT (OUTPATIENT)
Dept: MEDICAL GROUP | Facility: LAB | Age: 30
End: 2025-01-29
Payer: COMMERCIAL

## 2025-01-29 VITALS
HEART RATE: 86 BPM | DIASTOLIC BLOOD PRESSURE: 60 MMHG | OXYGEN SATURATION: 96 % | HEIGHT: 64 IN | TEMPERATURE: 97 F | SYSTOLIC BLOOD PRESSURE: 116 MMHG | BODY MASS INDEX: 21.08 KG/M2 | WEIGHT: 123.46 LBS | RESPIRATION RATE: 16 BRPM

## 2025-01-29 DIAGNOSIS — Z12.83 SKIN CANCER SCREENING: ICD-10-CM

## 2025-01-29 DIAGNOSIS — Z00.00 WELLNESS EXAMINATION: ICD-10-CM

## 2025-01-29 DIAGNOSIS — Z11.4 ENCOUNTER FOR SCREENING FOR HIV: ICD-10-CM

## 2025-01-29 DIAGNOSIS — Z13.29 SCREENING FOR THYROID DISORDER: ICD-10-CM

## 2025-01-29 DIAGNOSIS — Z13.220 LIPID SCREENING: ICD-10-CM

## 2025-01-29 DIAGNOSIS — Z11.59 NEED FOR HEPATITIS C SCREENING TEST: ICD-10-CM

## 2025-01-29 DIAGNOSIS — Z30.09 BIRTH CONTROL COUNSELING: ICD-10-CM

## 2025-01-29 DIAGNOSIS — G43.009 MIGRAINE WITHOUT AURA AND WITHOUT STATUS MIGRAINOSUS, NOT INTRACTABLE: ICD-10-CM

## 2025-01-29 PROBLEM — F41.1 GENERALIZED ANXIETY DISORDER: Status: RESOLVED | Noted: 2021-09-27 | Resolved: 2025-01-29

## 2025-01-29 PROBLEM — K21.9 GERD (GASTROESOPHAGEAL REFLUX DISEASE): Status: RESOLVED | Noted: 2020-11-02 | Resolved: 2025-01-29

## 2025-01-29 PROCEDURE — 3078F DIAST BP <80 MM HG: CPT | Performed by: PHYSICIAN ASSISTANT

## 2025-01-29 PROCEDURE — 3074F SYST BP LT 130 MM HG: CPT | Performed by: PHYSICIAN ASSISTANT

## 2025-01-29 PROCEDURE — 99385 PREV VISIT NEW AGE 18-39: CPT | Performed by: PHYSICIAN ASSISTANT

## 2025-01-29 RX ORDER — SUMATRIPTAN 50 MG/1
50 TABLET, FILM COATED ORAL
Qty: 10 TABLET | Refills: 3 | Status: SHIPPED | OUTPATIENT
Start: 2025-01-29

## 2025-01-29 RX ORDER — DROSPIRENONE AND ETHINYL ESTRADIOL 0.02-3(28)
1 KIT ORAL
Qty: 84 TABLET | Refills: 4 | Status: SHIPPED | OUTPATIENT
Start: 2025-01-29

## 2025-01-29 ASSESSMENT — PATIENT HEALTH QUESTIONNAIRE - PHQ9: CLINICAL INTERPRETATION OF PHQ2 SCORE: 0

## 2025-01-29 NOTE — PROGRESS NOTES
Subjective:     CC:  There were no encounter diagnoses.    HISTORY OF THE PRESENT ILLNESS: Patient is a 29 y.o. female. This pleasant patient is here today to establish care Verbal consent was acquired by the patient to use [employer] ambient listening note generation during this visit Yes     History of Present Illness  The patient presents for evaluation of migraines, freckles, and medication management.    #Migraines  She has a history of migraines, which have recently increased in severity, occurring at least once a month and causing significant disruption to her daily activities. She is unable to identify specific triggers for these episodes, despite working in an environment with constant exposure to computer screens. She does not experience any aura prior to the onset of the migraine. The duration of each episode is typically 24 hours, but can occasionally extend beyond this period. She has not previously used prescription medication for the management of her migraines. There is no correlation between her menstrual cycle and the occurrence of migraines. She uses corrective eyewear and manages her symptoms with over-the-counter Excedrin, ice packs, and rest.    # Skin Changes  She reports the presence of a large freckle on the sole of her foot, which has been present for an extended period. Additionally, she has noticed the recent development of a new freckle on the side of her foot. She reports no history of sunburns and maintains regular use of sunscreen when outdoors.    She is currently on birth control medication and requires a refill. She has a history of acid reflux, which is well-managed at present. She also has a history of anxiety and depression, both of which are currently under control. She has no known drug allergies. She has undergone dental extractions in the past. She maintains regular dental and eye examinations. She does not follow a specific diet and has a low level of physical activity,  primarily walking. She is uncertain about her HIV or hepatitis C screening status.    SOCIAL HISTORY  She reports no smoking or alcohol intake. She admits to occasional marijuana usage, about a couple of days a week.    FAMILY HISTORY  Her mother has migraines and hypothyroidism. Her father has high blood pressure. Her maternal grandmother had dementia. Her paternal grandmother had a stroke caused by blood clots. Her maternal aunt was diagnosed with breast cancer at age 60. Her paternal aunt has diabetes.    ALLERGIES  The patient has no known allergies.    MEDICATIONS  Current: Birth control  Past: Excedrin    IMMUNIZATIONS  She is up to date on her Tdap, hepatitis A, hepatitis B, and HPV vaccines.          Health Maintenance     - Dyslipidemia (30-45): ordered  - Diabetes (HTN, HLD, BMI >25): ordered  - Depression screening (PHQ-2 and/or PHQ-9): neg  - Dental: UTD  - Eye: UTD  Diet: regular  Exercise: sedentary  Substance Use: denies  Tobacco Use/counseling: denies  Safe in relationship: yes       Cancer screening  - Cervical CA (21-65): follows OBGYN, due 2030  -  HX Abnormal pap/HPV: none  - Skin cancer screening: derm referral ordered     Infectious disease screening/Immunizations  --STI/HIV Screening: ordered  --Practices safe sex.  --Hepatitis C Screening (18 to 80 yo): ordered  --Immunizations: UTD      Current Outpatient Medications Ordered in Epic   Medication Sig Dispense Refill    LORYNA 3-0.02 MG per tablet TAKE 1 TABLET BY MOUTH EVERY DAY 84 Tablet 4    fluticasone (FLONASE) 50 MCG/ACT nasal spray ADMINISTER 1 SPRAY INTO AFFECTED NOSTRIL(S) EVERY DAY. 16 mL 1    omeprazole (PRILOSEC) 20 MG delayed-release capsule Take 1 capsule by mouth 1 time a day as needed. 90 capsule 1     No current Epic-ordered facility-administered medications on file.       Health Maintenance: Completed    ROS:   Gen: no fevers/chills, no changes in weight  Eyes: no changes in vision  ENT: no sore throat, no hearing loss, no  "bloody nose  Pulm: no sob, no cough  CV: no chest pain, no palpitations  GI: no nausea/vomiting, no diarrhea  : no dysuria  MSk: no myalgias  Skin: no rash  Neuro: no headaches, no numbness/tingling  Heme/Lymph: no easy bruising      Objective:       Exam: /60 (BP Location: Left arm, Patient Position: Sitting, BP Cuff Size: Adult)   Pulse 86   Temp 36.1 °C (97 °F) (Temporal)   Resp 16   Ht 1.626 m (5' 4\")   Wt 56 kg (123 lb 7.3 oz)   SpO2 96%  Body mass index is 21.19 kg/m².    General: Normal appearing. No distress.  HEENT: Normocephalic. Eyes conjunctiva clear lids without ptosis, pupils equal and reactive to light accommodation, ears normal shape and contour, canals are clear bilaterally, tympanic membranes are benign, nasal mucosa benign, oropharynx is without erythema, edema or exudates.   Neck: Supple without JVD or bruit. Thyroid is not enlarged.  Pulmonary: Clear to ausculation.  Normal effort. No rales, ronchi, or wheezing.  Cardiovascular: Regular rate and rhythm without murmur. Carotid and radial pulses are intact and equal bilaterally.  Abdomen: Soft, nontender, nondistended. Normal bowel sounds. Liver and spleen are not palpable  Neurologic: Grossly nonfocal  Lymph: No cervical or supraclavicular lymph nodes are palpable  Skin: Warm and dry.  No obvious lesions.  Musculoskeletal: Normal gait. No extremity cyanosis, clubbing, or edema.  Psych: Normal mood and affect. Alert and oriented x3. Judgment and insight is normal.      Assessment & Plan:   29 y.o. female with the following -    1. Wellness examination  She is up to date on her Tdap, hepatitis A, hepatitis B, and HPV vaccines. Baseline labs, including HIV and hepatitis C screening, have been ordered    2. Birth control counseling  Continue current medication  - drospirenone-ethinyl estradiol (LORYNA) 3-0.02 MG per tablet; Take 1 Tablet by mouth every day.  Dispense: 84 Tablet; Refill: 4    3. Migraine without aura and without status " migrainosus, not intractable  She experiences migraines approximately once a month, which are debilitating and last around 24 hours. Triggers have not been clearly identified, but stress and eye strain from work are potential factors. Over-the-counter magnesium supplements may help reduce the frequency of migraines. A prescription for Imitrex has been issued to [facility] on [address], to be taken as needed at the onset of a migraine. If the migraine persists, the dose can be repeated after an hour, with a maximum of two doses within 24 hours.  - SUMAtriptan (IMITREX) 50 MG Tab; Take 1 Tablet by mouth one time as needed for Migraine for up to 1 dose.  Dispense: 10 Tablet; Refill: 3    4. Lipid screening  - CBC WITH DIFFERENTIAL; Future  - Comp Metabolic Panel; Future  - Lipid Profile; Future    5. Screening for thyroid disorder  - TSH WITH REFLEX TO FT4; Future    6. Encounter for screening for HIV  - HIV AG/AB COMBO ASSAY SCREENING; Future    7. Need for hepatitis C screening test  - HEP C VIRUS ANTIBODY; Future    8. Skin cancer screening  She has a long-standing freckle on the bottom of her foot and a new freckle on the side of her foot. Both appear benign, but a dermatology referral has been made for further evaluation. She will receive a message in [employer] to schedule the appointment once the referral is processed.  - Referral to Dermatology        I spent a total of 16 minutes with record review, exam, communication with the patient, communication with other providers, and documentation of this encounter.    No follow-ups on file.    Please note that this dictation was created using voice recognition software. I have made every reasonable attempt to correct obvious errors, but I expect that there are errors of grammar and possibly content that I did not discover before finalizing the note.

## 2025-02-03 ENCOUNTER — HOSPITAL ENCOUNTER (OUTPATIENT)
Dept: RADIOLOGY | Facility: MEDICAL CENTER | Age: 30
End: 2025-02-03
Attending: STUDENT IN AN ORGANIZED HEALTH CARE EDUCATION/TRAINING PROGRAM
Payer: COMMERCIAL

## 2025-02-03 ENCOUNTER — TELEPHONE (OUTPATIENT)
Dept: OBGYN | Facility: CLINIC | Age: 30
End: 2025-02-03
Payer: COMMERCIAL

## 2025-02-03 DIAGNOSIS — N63.21 MASS OF UPPER OUTER QUADRANT OF LEFT BREAST: ICD-10-CM

## 2025-02-03 DIAGNOSIS — N63.11 MASS OF UPPER OUTER QUADRANT OF RIGHT BREAST: ICD-10-CM

## 2025-02-03 PROCEDURE — G0279 TOMOSYNTHESIS, MAMMO: HCPCS

## 2025-02-03 PROCEDURE — 76642 ULTRASOUND BREAST LIMITED: CPT | Mod: RT

## 2025-02-03 NOTE — PROGRESS NOTES
Assessment & Plan  Mass of upper outer quadrant of left breast  Recent breast US showed a 1.3 cm left breast mass, possibly a fibroadenoma. Low suspicion for malignancy. Ultrasound-guided biopsy is advised.   Orders:    US-ULTRASOUND BIOPSY LEFT INITIAL LESION; Future     - Called pt and left message for her to c/b. Plan to review above results.

## 2025-02-05 ENCOUNTER — TELEPHONE (OUTPATIENT)
Dept: OBGYN | Facility: CLINIC | Age: 30
End: 2025-02-05
Payer: COMMERCIAL

## 2025-02-06 NOTE — TELEPHONE ENCOUNTER
- Called patient back on 2/3/25 and reviewed her recent breast US showed a 1.3 cm left breast mass, possibly a fibroadenoma. Low suspicion for malignancy. Ultrasound-guided biopsy is advised. Her appointment is scheduled for 2/7/25.

## 2025-02-07 ENCOUNTER — HOSPITAL ENCOUNTER (OUTPATIENT)
Dept: RADIOLOGY | Facility: MEDICAL CENTER | Age: 30
End: 2025-02-07
Attending: STUDENT IN AN ORGANIZED HEALTH CARE EDUCATION/TRAINING PROGRAM
Payer: COMMERCIAL

## 2025-02-07 DIAGNOSIS — N63.21 MASS OF UPPER OUTER QUADRANT OF LEFT BREAST: ICD-10-CM

## 2025-02-07 LAB — PATHOLOGY CONSULT NOTE: NORMAL

## 2025-02-07 PROCEDURE — 88305 TISSUE EXAM BY PATHOLOGIST: CPT | Performed by: PATHOLOGY

## 2025-02-07 PROCEDURE — 88305 TISSUE EXAM BY PATHOLOGIST: CPT | Mod: 26 | Performed by: PATHOLOGY

## 2025-02-07 PROCEDURE — 19083 BX BREAST 1ST LESION US IMAG: CPT | Mod: LT

## 2025-02-10 ENCOUNTER — TELEPHONE (OUTPATIENT)
Dept: RADIOLOGY | Facility: MEDICAL CENTER | Age: 30
End: 2025-02-10
Payer: COMMERCIAL

## 2025-02-10 ENCOUNTER — APPOINTMENT (OUTPATIENT)
Dept: RADIOLOGY | Facility: MEDICAL CENTER | Age: 30
End: 2025-02-10
Attending: STUDENT IN AN ORGANIZED HEALTH CARE EDUCATION/TRAINING PROGRAM
Payer: COMMERCIAL

## 2025-02-12 ENCOUNTER — RESULTS FOLLOW-UP (OUTPATIENT)
Dept: OBGYN | Facility: CLINIC | Age: 30
End: 2025-02-12

## 2025-05-08 ENCOUNTER — HOSPITAL ENCOUNTER (OUTPATIENT)
Dept: LAB | Facility: MEDICAL CENTER | Age: 30
End: 2025-05-08
Attending: PHYSICIAN ASSISTANT
Payer: COMMERCIAL

## 2025-05-08 DIAGNOSIS — Z13.220 LIPID SCREENING: ICD-10-CM

## 2025-05-08 DIAGNOSIS — Z13.29 SCREENING FOR THYROID DISORDER: ICD-10-CM

## 2025-05-08 DIAGNOSIS — Z11.4 ENCOUNTER FOR SCREENING FOR HIV: ICD-10-CM

## 2025-05-08 DIAGNOSIS — Z11.59 NEED FOR HEPATITIS C SCREENING TEST: ICD-10-CM

## 2025-05-08 LAB
BASOPHILS # BLD AUTO: 0.8 % (ref 0–1.8)
BASOPHILS # BLD: 0.04 K/UL (ref 0–0.12)
EOSINOPHIL # BLD AUTO: 0.18 K/UL (ref 0–0.51)
EOSINOPHIL NFR BLD: 3.4 % (ref 0–6.9)
ERYTHROCYTE [DISTWIDTH] IN BLOOD BY AUTOMATED COUNT: 40.2 FL (ref 35.9–50)
HCT VFR BLD AUTO: 48.1 % (ref 37–47)
HCV AB SER QL: NORMAL
HGB BLD-MCNC: 16.4 G/DL (ref 12–16)
HIV 1+2 AB+HIV1 P24 AG SERPL QL IA: NORMAL
IMM GRANULOCYTES # BLD AUTO: 0.01 K/UL (ref 0–0.11)
IMM GRANULOCYTES NFR BLD AUTO: 0.2 % (ref 0–0.9)
LYMPHOCYTES # BLD AUTO: 2.55 K/UL (ref 1–4.8)
LYMPHOCYTES NFR BLD: 48.1 % (ref 22–41)
MCH RBC QN AUTO: 30.2 PG (ref 27–33)
MCHC RBC AUTO-ENTMCNC: 34.1 G/DL (ref 32.2–35.5)
MCV RBC AUTO: 88.6 FL (ref 81.4–97.8)
MONOCYTES # BLD AUTO: 0.26 K/UL (ref 0–0.85)
MONOCYTES NFR BLD AUTO: 4.9 % (ref 0–13.4)
NEUTROPHILS # BLD AUTO: 2.26 K/UL (ref 1.82–7.42)
NEUTROPHILS NFR BLD: 42.6 % (ref 44–72)
NRBC # BLD AUTO: 0 K/UL
NRBC BLD-RTO: 0 /100 WBC (ref 0–0.2)
PLATELET # BLD AUTO: 324 K/UL (ref 164–446)
PMV BLD AUTO: 10 FL (ref 9–12.9)
RBC # BLD AUTO: 5.43 M/UL (ref 4.2–5.4)
WBC # BLD AUTO: 5.3 K/UL (ref 4.8–10.8)

## 2025-05-08 PROCEDURE — 80053 COMPREHEN METABOLIC PANEL: CPT

## 2025-05-08 PROCEDURE — 36415 COLL VENOUS BLD VENIPUNCTURE: CPT

## 2025-05-08 PROCEDURE — 85025 COMPLETE CBC W/AUTO DIFF WBC: CPT

## 2025-05-08 PROCEDURE — 87389 HIV-1 AG W/HIV-1&-2 AB AG IA: CPT

## 2025-05-08 PROCEDURE — 86803 HEPATITIS C AB TEST: CPT

## 2025-05-08 PROCEDURE — 80061 LIPID PANEL: CPT

## 2025-05-08 PROCEDURE — 84443 ASSAY THYROID STIM HORMONE: CPT

## 2025-05-09 ENCOUNTER — RESULTS FOLLOW-UP (OUTPATIENT)
Dept: MEDICAL GROUP | Facility: LAB | Age: 30
End: 2025-05-09

## 2025-05-09 DIAGNOSIS — R71.8 ELEVATED RED BLOOD CELL COUNT: ICD-10-CM

## 2025-05-09 LAB
ALBUMIN SERPL BCP-MCNC: 4.3 G/DL (ref 3.2–4.9)
ALBUMIN/GLOB SERPL: 1.4 G/DL
ALP SERPL-CCNC: 45 U/L (ref 30–99)
ALT SERPL-CCNC: 17 U/L (ref 2–50)
ANION GAP SERPL CALC-SCNC: 11 MMOL/L (ref 7–16)
AST SERPL-CCNC: 26 U/L (ref 12–45)
BILIRUB SERPL-MCNC: 0.4 MG/DL (ref 0.1–1.5)
BUN SERPL-MCNC: 7 MG/DL (ref 8–22)
CALCIUM ALBUM COR SERPL-MCNC: 9.4 MG/DL (ref 8.5–10.5)
CALCIUM SERPL-MCNC: 9.6 MG/DL (ref 8.5–10.5)
CHLORIDE SERPL-SCNC: 105 MMOL/L (ref 96–112)
CHOLEST SERPL-MCNC: 249 MG/DL (ref 100–199)
CO2 SERPL-SCNC: 22 MMOL/L (ref 20–33)
CREAT SERPL-MCNC: 1.05 MG/DL (ref 0.5–1.4)
FASTING STATUS PATIENT QL REPORTED: NORMAL
GFR SERPLBLD CREATININE-BSD FMLA CKD-EPI: 72 ML/MIN/1.73 M 2
GLOBULIN SER CALC-MCNC: 3 G/DL (ref 1.9–3.5)
GLUCOSE SERPL-MCNC: 72 MG/DL (ref 65–99)
HDLC SERPL-MCNC: 104 MG/DL
LDLC SERPL CALC-MCNC: 121 MG/DL
POTASSIUM SERPL-SCNC: 4.5 MMOL/L (ref 3.6–5.5)
PROT SERPL-MCNC: 7.3 G/DL (ref 6–8.2)
SODIUM SERPL-SCNC: 138 MMOL/L (ref 135–145)
TRIGL SERPL-MCNC: 120 MG/DL (ref 0–149)
TSH SERPL DL<=0.005 MIU/L-ACNC: 1.42 UIU/ML (ref 0.38–5.33)

## 2025-08-06 ENCOUNTER — APPOINTMENT (OUTPATIENT)
Facility: MEDICAL CENTER | Age: 30
End: 2025-08-06
Payer: COMMERCIAL